# Patient Record
Sex: FEMALE | Race: WHITE | NOT HISPANIC OR LATINO | Employment: FULL TIME
[De-identification: names, ages, dates, MRNs, and addresses within clinical notes are randomized per-mention and may not be internally consistent; named-entity substitution may affect disease eponyms.]

---

## 2017-07-15 ENCOUNTER — HEALTH MAINTENANCE LETTER (OUTPATIENT)
Age: 32
End: 2017-07-15

## 2021-08-03 ENCOUNTER — PRENATAL OFFICE VISIT (OUTPATIENT)
Dept: OBGYN | Facility: CLINIC | Age: 36
End: 2021-08-03

## 2021-08-03 ENCOUNTER — APPOINTMENT (OUTPATIENT)
Dept: OBGYN | Facility: CLINIC | Age: 36
End: 2021-08-03
Payer: COMMERCIAL

## 2021-08-03 DIAGNOSIS — Z34.00 PRENATAL CARE, FIRST PREGNANCY: ICD-10-CM

## 2021-08-03 PROCEDURE — 99207 PR NO CHARGE NURSE ONLY: CPT | Performed by: OBSTETRICS & GYNECOLOGY

## 2021-08-03 RX ORDER — PRENATAL VIT/IRON FUM/FOLIC AC 27MG-0.8MG
1 TABLET ORAL DAILY
COMMUNITY

## 2021-08-03 NOTE — PROGRESS NOTES
Lifestyle and nutrition teaching completed   [facility] OB Intake Nurse    Patient supplied answers from flow sheet for:  Prenatal OB Questionnaire.  Past Medical History  Have you ever recieved care for your mental health? 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)  Within the last year, has anyone hit, slapped, kicked or otherwise hurt you?: No  In the last year, has anyone forced you to have sex when you didn't want to?: No    Past Medical History 2   Have you ever received a blood transfusion?: No  Would you accept a blood transfusion if was medically recommended?: Yes  Does anyone in your home smoke?: No   Is your blood type Rh negative?: Unknown  Have you ever ?: No  Have you been hospitalized for a nonsurgical reason excluding normal delivery?: (!) Yes (TBI - was in a coma for about 4-5 days- rehab)  Have you ever had an abnormal pap smear?: No    Past Medical History (Continued)  Do you have a history of abnormalities of the uterus?: No  Did your mother take MANDY or any other hormones when she was pregnant with you?: Unknown  Do you have any other problems we have not asked about which you feel may be important to this pregnancy?: No

## 2021-08-06 ENCOUNTER — TRANSCRIBE ORDERS (OUTPATIENT)
Dept: MATERNAL FETAL MEDICINE | Facility: HOSPITAL | Age: 36
End: 2021-08-06

## 2021-08-06 ENCOUNTER — PRENATAL OFFICE VISIT (OUTPATIENT)
Dept: OBGYN | Facility: CLINIC | Age: 36
End: 2021-08-06
Payer: COMMERCIAL

## 2021-08-06 VITALS
HEIGHT: 62 IN | BODY MASS INDEX: 41.49 KG/M2 | DIASTOLIC BLOOD PRESSURE: 65 MMHG | HEART RATE: 80 BPM | SYSTOLIC BLOOD PRESSURE: 108 MMHG | WEIGHT: 225.5 LBS | TEMPERATURE: 98.7 F | RESPIRATION RATE: 12 BRPM

## 2021-08-06 DIAGNOSIS — Z34.01 ENCOUNTER FOR PRENATAL CARE IN FIRST TRIMESTER OF FIRST PREGNANCY: Primary | ICD-10-CM

## 2021-08-06 DIAGNOSIS — O26.90 PREGNANCY RELATED CONDITION, ANTEPARTUM: Primary | ICD-10-CM

## 2021-08-06 DIAGNOSIS — O21.9 NAUSEA AND VOMITING IN PREGNANCY: ICD-10-CM

## 2021-08-06 LAB
ABO/RH(D): NORMAL
ALBUMIN UR-MCNC: NEGATIVE MG/DL
ANTIBODY SCREEN: NEGATIVE
APPEARANCE UR: CLEAR
BACTERIA #/AREA URNS HPF: ABNORMAL /HPF
BILIRUB UR QL STRIP: NEGATIVE
COLOR UR AUTO: YELLOW
ERYTHROCYTE [DISTWIDTH] IN BLOOD BY AUTOMATED COUNT: 13 % (ref 10–15)
GLUCOSE UR STRIP-MCNC: NEGATIVE MG/DL
HBV SURFACE AG SERPL QL IA: NONREACTIVE
HCT VFR BLD AUTO: 40.8 % (ref 35–47)
HCV AB SERPL QL IA: NONREACTIVE
HGB BLD-MCNC: 13.4 G/DL (ref 11.7–15.7)
HGB UR QL STRIP: ABNORMAL
HIV 1+2 AB+HIV1 P24 AG SERPL QL IA: NONREACTIVE
KETONES UR STRIP-MCNC: ABNORMAL MG/DL
LEUKOCYTE ESTERASE UR QL STRIP: NEGATIVE
MCH RBC QN AUTO: 29.5 PG (ref 26.5–33)
MCHC RBC AUTO-ENTMCNC: 32.8 G/DL (ref 31.5–36.5)
MCV RBC AUTO: 90 FL (ref 78–100)
NITRATE UR QL: NEGATIVE
PH UR STRIP: 6 [PH] (ref 5–7)
PLATELET # BLD AUTO: 270 10E3/UL (ref 150–450)
RBC # BLD AUTO: 4.55 10E6/UL (ref 3.8–5.2)
RBC #/AREA URNS AUTO: ABNORMAL /HPF
SP GR UR STRIP: 1.02 (ref 1–1.03)
SPECIMEN EXPIRATION DATE: NORMAL
SQUAMOUS #/AREA URNS AUTO: ABNORMAL /LPF
T PALLIDUM AB SER QL: NONREACTIVE
UROBILINOGEN UR STRIP-ACNC: 0.2 E.U./DL
WBC # BLD AUTO: 9.9 10E3/UL (ref 4–11)
WBC #/AREA URNS AUTO: ABNORMAL /HPF

## 2021-08-06 PROCEDURE — 81001 URINALYSIS AUTO W/SCOPE: CPT | Performed by: OBSTETRICS & GYNECOLOGY

## 2021-08-06 PROCEDURE — 86900 BLOOD TYPING SEROLOGIC ABO: CPT | Performed by: OBSTETRICS & GYNECOLOGY

## 2021-08-06 PROCEDURE — 86803 HEPATITIS C AB TEST: CPT | Performed by: OBSTETRICS & GYNECOLOGY

## 2021-08-06 PROCEDURE — 76817 TRANSVAGINAL US OBSTETRIC: CPT | Performed by: OBSTETRICS & GYNECOLOGY

## 2021-08-06 PROCEDURE — 86780 TREPONEMA PALLIDUM: CPT | Performed by: OBSTETRICS & GYNECOLOGY

## 2021-08-06 PROCEDURE — 87389 HIV-1 AG W/HIV-1&-2 AB AG IA: CPT | Performed by: OBSTETRICS & GYNECOLOGY

## 2021-08-06 PROCEDURE — 85027 COMPLETE CBC AUTOMATED: CPT | Performed by: OBSTETRICS & GYNECOLOGY

## 2021-08-06 PROCEDURE — 86762 RUBELLA ANTIBODY: CPT | Performed by: OBSTETRICS & GYNECOLOGY

## 2021-08-06 PROCEDURE — 87491 CHLMYD TRACH DNA AMP PROBE: CPT | Performed by: OBSTETRICS & GYNECOLOGY

## 2021-08-06 PROCEDURE — 86901 BLOOD TYPING SEROLOGIC RH(D): CPT | Performed by: OBSTETRICS & GYNECOLOGY

## 2021-08-06 PROCEDURE — 36415 COLL VENOUS BLD VENIPUNCTURE: CPT | Performed by: OBSTETRICS & GYNECOLOGY

## 2021-08-06 PROCEDURE — 99207 PR FIRST OB VISIT: CPT | Performed by: OBSTETRICS & GYNECOLOGY

## 2021-08-06 PROCEDURE — 87086 URINE CULTURE/COLONY COUNT: CPT | Performed by: OBSTETRICS & GYNECOLOGY

## 2021-08-06 PROCEDURE — 87591 N.GONORRHOEAE DNA AMP PROB: CPT | Performed by: OBSTETRICS & GYNECOLOGY

## 2021-08-06 PROCEDURE — 86850 RBC ANTIBODY SCREEN: CPT | Performed by: OBSTETRICS & GYNECOLOGY

## 2021-08-06 PROCEDURE — 87340 HEPATITIS B SURFACE AG IA: CPT | Performed by: OBSTETRICS & GYNECOLOGY

## 2021-08-06 RX ORDER — ONDANSETRON 4 MG/1
4 TABLET, ORALLY DISINTEGRATING ORAL EVERY 8 HOURS PRN
Qty: 20 TABLET | Refills: 1 | Status: SHIPPED | OUTPATIENT
Start: 2021-08-06 | End: 2021-11-05

## 2021-08-06 ASSESSMENT — MIFFLIN-ST. JEOR: SCORE: 1681.11

## 2021-08-06 NOTE — PROGRESS NOTES
Discussed physician coverage, tertiary support, diet, exercise, weight gain, schedule of visits, routine and indicated ultrasounds, childbirth education and antepartum testing for certain birth defects.  Encouraged patient to review contents of Prenatal Breastfeeding Education Toolkit. Offered opportunity to answer questions regarding the importance of skin to skin contact, early initiation of exclusive breastfeeding for the first six months and rooming in while in the hospital.    Syphilis is a sexually transmitted disease that can cause birth defects in the babies of untreated mothers. Every pregnant patient is tested for syphilis early in each pregnancy as part of the routine lab work. The [place] Department of [facility] has seen an increase in the rate of syphilis in [place]. The [facility] now recommends testing for syphilis 2 times during a pregnancy, the new prenatal visit, and 28 weeks or when admitted for delivery. Patient accepts lab testing for syphilis.    Group call support for deliveries was discussed, as well as tertiary support in event of high risk issues within [facility].    Discussed current state of COVID-19 in pregnancy, when to seek out emergency care, how to self care at home with milder symptoms.  Discussed COVID vaccine, latest safety information, potential benefits in pregnancy to mom and baby (lower risk for hospitalization and death)        Options for  testing for birth defects were discussed with the patient, generally including CVS testing, Quad screen serum testing, nuchal lucency/blood marker testing, genetic amniocentesis, NIPT testing  and/or level 2 ultrasound. Pt declined testing at this time    Current issues include: nausea, moderate  Conceived after first cycle of Clomid    Past medical, surgical, social and family histories reviewed on OB questionaire and included on episode summary.  Pertinent review of systems items stated above. See OB  "questionaire for pertinent components of HPI.    Past Medical History:   Diagnosis Date     Seizure (H)     x1 after her brain surgery     See epic chart    Past Surgical History:   Procedure Laterality Date     BRAIN SURGERY  2015    TBI after falling down stairs     See epic chart    Patient Active Problem List    Diagnosis Date Noted     Thickening of nuchal fold of fetus 08/06/2021     Priority: Medium     Prenatal care, first pregnancy 08/03/2021     Priority: Medium     Expressive aphasia 10/23/2015     Priority: Medium     CARDIOVASCULAR SCREENING; LDL GOAL LESS THAN 160 09/11/2015     Priority: Medium     Traumatic brain injury with prolonged (more than 24 hours) loss of consciousness with return to pre-existing conscious level (H) 07/05/2015     Priority: Medium     Intracranial hemorrhage (H) 07/05/2015     Priority: Medium     Impulsiveness, due to TBI 07/05/2015     Priority: Medium       OBJECTIVE: /65 (BP Location: Right arm, Patient Position: Sitting, Cuff Size: Adult Regular)   Pulse 80   Temp 98.7  F (37.1  C) (Tympanic)   Resp 12   Ht 1.575 m (5' 2\")   Wt 102.3 kg (225 lb 8 oz)   LMP 05/28/2021   BMI 41.24 kg/m      GENERAL APPEARANCE: healthy, alert and no distress  ABDOMEN:  soft, nontender, no hepato-splenomegaly or hernias  PELVIC:  EGBUS:  within normal limits  VAGINA:  normoestrogenic, well-supported, no unusual discharge  CERVIX:  smooth, non-friable, no gross lesions, thin-layer PAP was not taken  UTERUS:  anteverted and enlarged, 9 weeks size  ADNEXAE:  non-tender, no masses palpable, no cul de sac nodularity     NECK: no adenopathy, no asymmetry, masses, or scars and thyroid normal to palpation     RESP: lungs clear to auscultation , respiratory effort WNL     CV: regular rates and rhythm, normal S1 S2, no S3 or S4 and no murmur, click or rub -     SKIN: no suspicious lesions or rashes     PSYCH: mentation appears normal. and affect normal/bright, oriented to " person/place/time     LYMPHATICS: No axillary, cervical, inguinal, or supraclavicular nodes    Transvaginal ultrasound was performed. A live single intrauterine pregnancy was seen.  CRL=2.95 cm, c/w 9 weeks, 6 days.  EDC by sono =3/5/22  EDC by LMP=3/4/22+++    Of note, there was a curvilinear fluid collection of posterior nuchal area of fetal pole    Fetal heart motion was visualized. VWY=851 bpm                    ASSESSMENT:    ICD-10-CM    1. Prenatal care, first pregnancy  Z34.00 US OB <14 Weeks w Transvaginal Single     ABO/Rh type and screen     CBC with platelets     Hepatitis B surface antigen     Rubella Antibody IgG     HIV Antigen Antibody Combo     Treponema Abs w Reflex to RPR and Titer     Hepatitis C Screen Reflex to HCV RNA Quant and Genotype     UA with Microscopic     Urine Culture     Chlamydia trachomatis/Neisseria gonorrhoeae by PCR   2. Nausea and vomiting in pregnancy  O21.9 ondansetron (ZOFRAN-ODT) 4 MG ODT tab   3. Thickening of nuchal fold of fetus  O35.1XX0 [facility] Referral - Pregnancy         PLAN: see orders and OB problem list annotations  Recommend referral to MFM for second opinion about nuchal fluid collection and any further testing advised  F/u 4 weeks    Lupe Goldstein MD

## 2021-08-06 NOTE — NURSING NOTE
"Initial /65 (BP Location: Right arm, Patient Position: Sitting, Cuff Size: Adult Regular)   Pulse 80   Temp 98.7  F (37.1  C) (Tympanic)   Resp 12   Ht 1.575 m (5' 2\")   Wt 102.3 kg (225 lb 8 oz)   LMP 05/28/2021   BMI 41.24 kg/m   Estimated body mass index is 41.24 kg/m  as calculated from the following:    Height as of this encounter: 1.575 m (5' 2\").    Weight as of this encounter: 102.3 kg (225 lb 8 oz). .      "

## 2021-08-07 LAB
BACTERIA UR CULT: NO GROWTH
C TRACH DNA SPEC QL PROBE+SIG AMP: NEGATIVE
N GONORRHOEA DNA SPEC QL NAA+PROBE: NEGATIVE

## 2021-08-09 LAB
RUBV IGG SERPL QL IA: 3.41 INDEX
RUBV IGG SERPL QL IA: POSITIVE

## 2021-08-23 ENCOUNTER — PRE VISIT (OUTPATIENT)
Dept: MATERNAL FETAL MEDICINE | Facility: HOSPITAL | Age: 36
End: 2021-08-23

## 2021-08-26 NOTE — PROGRESS NOTES
[facility]  Genetic Counseling Consult    Patient: Vianey Cortez YOB: 1985   Date of Service:  21      Vianey Cortez was seen at the [facility] for genetic consultation given abnormal ultrasound findings. Vianey was accompanied to the appointment today by her partner, Sung.    Impression/Plan:   1. Vianey was referred due to subjectively increased nuchal fluid on ultrasound at 9w6d. NT ultrasound was performed today and nuchal translucency measurement and nasal bone assessment were within normal limits. Please see ultrasound report under the Imaging tab for details.     2. Vianey had a blood draw for NIPT (NIPS test through [facility]). The patient does NOT wish to know the predicted genetic sex of the pregnancy and sex chromosome aneuploidy analysis was declined. Results are expected within 7-10 days, and will be available in [employer]. We will contact her to discuss the results, and a copy will be forwarded to the office of the referring OB provider. The patient provided verbal permission to leave a detailed message with results on her voicemail.     Pregnancy History:   /Parity:                            Age at Delivery: 36 year old  SEHY: 3/4/2022, by Last Menstrual Period                  Gestational Age: 13w0d  -  No significant complications or exposures were reported in the current pregnancy.    Medical History:   Vianey mclaughlin reported medical history is not expected to impact pregnancy management or risks to fetal development.       Family History:   A three-generation pedigree was obtained, and is scanned under the  Media  tab.   The following significant findings were reported by Vianey:    The father of the pregnancy, Sung, is 38 and healthy.      Cancer: Vianey reports that her father had colon cancer diagnosed in his 40's. Sung reports that a maternal aunt had breast cancer diagnosed in her  "30's-40's. Sung also reports a maternal cousin with leukemia diagnosed in her 30's.     We very briefly discussed the family history of cancer. We discussed that most cancer seen in families occurs sporadically, but about 5-10% may be due to an underlying genetic etiology. We discussed that both breast cancer and colon cancer diagnosed age 50 are rare and can be associated with inherited cancer predisposition syndromes. Genetic counseling is available for cancer syndromes. Cancer family history, even without genetic testing, can change cancer screening recommendations for family members and aid in insurance coverage for access to them as well. The most informative individuals to complete cancer genetic counseling and genetic testing are those with a personal history of cancer or those closely related to the affected individuals. If the family wants more information they can contact the [facility] Cancer Risk Management Program ([phone]). Physicians can also make referrals at [email or web address] or, if within the [employer] system, through [facility] referral for \"Cancer Risk Mgmt/Cancer Genetic Counseling\"     Otherwise, the reported family history is negative for multiple miscarriages, stillbirths, birth defects, intellectual disability, known genetic conditions, and consanguinity.       Carrier Screening:   The patient reports that she and the father of the pregnancy have  ancestry:     Cystic fibrosis is an autosomal recessive genetic condition that occurs with increased frequency in individuals of  ancestry and carrier screening for this condition is available.  In addition,  screening in the [facility] includes cystic fibrosis.    The patient reports that she is of Mediterranean ([place]) ancestry:     The hemoglobinopathies are a group of genetic blood diseases that occur with increased frequency in individuals of Mediterranean " ancestry and carrier screening for these conditions is available.  Carrier screening for the hemoglobinopathies includes a CBC with red blood cell indices, a ferritin level, and a quantitative hemoglobin electrophoresis or HPLC.  In addition,  screening in the [facility] includes many of the hemoglobinopathies.      Expanded carrier screening for mutations in a large panel of genes associated with autosomal recessive conditions including cystic fibrosis, spinal muscular atrophy, and others, is now available.      Carrier screening was beyond the scope of our discussion today, but remains available to the couple.        Risk Assessment:   We explained that the risk for fetal chromosome abnormalities increases with maternal age. We discussed specific features of common chromosome abnormalities, including Down syndrome, trisomy 13, trisomy 18, and sex chromosome trisomies.      - At age 36 at midtrimester, the risk to have a baby with Down syndrome is 1 in 342.     - At age 36 at midtrimester, the risk to have a baby with any chromosome abnormality is 1 in  172.     The patient had a first trimester transvaginal ultrasound at 9w6d which showed subjectively increased nuchal fluid. CRL was 29.5mm. Additional aneuploidy screening options were declined at that time.     We started by discussing that generally nuchal translucency measurements are not able to be taken accurately until a fetus is between 11w4d - 13w6d gestation (or CRL of 45 and 84mm). We discussed that an accurate NT measurement will need to be taken in order for an accurate risk assessment to be provided. Vianey had an NT ultrasound scheduled following our visit today to obtain an accurate NT measurement. We discussed the following information is true IF there is a thick NT identified on her NT ultrasound.      We discussed that an increased nuchal translucency can be a marker for chromosomal abnormalities, heart defects, skeletal dysplasias,  and other more rare genetic conditions. The specific risks associated with an increased NT are dependent on the size of the NT measurement. Due to these associations, additional screening/testing is available for these pregnancies which may include:    Non-invasive prenatal screening (NIPT) and/or diagnostic testing via chorionic villus sampling (CVS) or amniocentesis    Comprehensive ultrasound around 18-20 weeks gestation    Fetal echocardiogram around or after 22 weeks gestation      There has been some data in the literature to suggest that when a pregnancy with an increased nuchal translucency has been shown to have normal chromosomes (through a CVS or amniocentesis) and had a normal ultrasound and normal fetal echocardiogram, the risk of a major medical or developmental issue in that pregnancy is likely back to around the general population risk for these issues.     We also discussed that a thickened NT can result in a cystic hygroma, which is a congenital malformation resulting from lymph accumulation and are often large in size and extend further down the length of the fetus than an increased nuchal translucency. Cystic hygromas are generally more concerning than an increased nuchal translucency.     We broadly discussed the possibility of more rare genetic conditions being the cause of the extra fluid. These more rare possibilities include chromosomal microdeletions/microduplications, as well as monogenic conditions.     We discussed the options of screening by NIPT or diagnostic options such as a CVS or amniocentesis. After our discussion of the utility, benefits, risks, and limitations, Vianey elected to proceed with NIPT (NIPS through [facility]) during today's consult.        Testing Options:   We discussed the following options:   Non-invasive Prenatal Testing (NIPT)    Maternal plasma cell-free DNA testing; first trimester ultrasound with nuchal translucency and nasal bone assessment is recommended,  when appropriate    Screens for fetal trisomy 21, trisomy 13, trisomy 18, and sex chromosome aneuploidy    Cannot screen for open neural tube defects; maternal serum AFP after 15 weeks is recommended     Chorionic villus sampling (CVS)    Invasive procedure typically performed in the first trimester by which placental villi are obtained for the purpose of chromosome analysis and/or other prenatal genetic analysis    Diagnostic results; >99% sensitivity for fetal chromosome abnormalities    Cannot test for open neural tube defects; maternal serum AFP after 15 weeks is recommended     Genetic Amniocentesis    Invasive procedure typically performed in the second trimester by which amniotic fluid is obtained for the purpose of chromosome analysis and/or other prenatal genetic analysis    Diagnostic results; >99% sensitivity for fetal chromosome abnormalities    AFAFP measurement tests for open neural tube defects     Comprehensive (Level II) ultrasound: Detailed ultrasound performed between 18-22 weeks gestation to screen for major birth defects and markers for aneuploidy.    We reviewed the benefits and limitations of this testing.  Screening tests provide a risk assessment specific to the pregnancy for certain fetal chromosome abnormalities, but cannot definitively diagnose or exclude a fetal chromosome abnormality.  Follow-up genetic counseling and consideration of diagnostic testing is recommended with any abnormal screening result.     Diagnostic tests carry inherent risks- including risk of miscarriage- that require careful consideration.  These tests can detect fetal chromosome abnormalities with greater than 99% certainty.  Results can be compromised by maternal cell contamination or mosaicism, and are limited by the resolution of cytogenetic G-banding technology.  There is no screening nor diagnostic test that can detect all forms of birth defects or mental disability.    It was a pleasure to be involved with  Vianey hatfield. Face-to-face time of the meeting was 35 minutes.  Candy Duran CGC  Genetic Counselor  [facility]  Maternal Fetal Medicine  Phone: [phone]  [email or web address]     Patient seen, evaluated and discussed with the Genetic Counseling Intern. I have verified the content of the note, which accurately reflects my assessment of the patient and the plan of care.   Supervising Genetic Counselor   Gadiel Crawford MS, [facility]   Maternal Fetal Medicine  [facility]   Phone: [phone]   Email: [email or web address]

## 2021-08-27 ENCOUNTER — ANCILLARY PROCEDURE (OUTPATIENT)
Dept: ULTRASOUND IMAGING | Facility: HOSPITAL | Age: 36
End: 2021-08-27
Attending: OBSTETRICS & GYNECOLOGY
Payer: COMMERCIAL

## 2021-08-27 ENCOUNTER — OFFICE VISIT (OUTPATIENT)
Dept: MATERNAL FETAL MEDICINE | Facility: HOSPITAL | Age: 36
End: 2021-08-27
Attending: OBSTETRICS & GYNECOLOGY
Payer: COMMERCIAL

## 2021-08-27 ENCOUNTER — LAB (OUTPATIENT)
Dept: LAB | Facility: HOSPITAL | Age: 36
End: 2021-08-27
Attending: OBSTETRICS & GYNECOLOGY
Payer: COMMERCIAL

## 2021-08-27 DIAGNOSIS — O26.90 PREGNANCY RELATED CONDITION, ANTEPARTUM: ICD-10-CM

## 2021-08-27 DIAGNOSIS — Z36.9 UNSPECIFIED ANTENATAL SCREENING: ICD-10-CM

## 2021-08-27 DIAGNOSIS — O35.9XX0 FETAL ABNORMALITY AFFECTING MANAGEMENT OF MOTHER, SINGLE OR UNSPECIFIED FETUS: Primary | ICD-10-CM

## 2021-08-27 DIAGNOSIS — O28.3 ABNORMAL PRENATAL ULTRASOUND: Primary | ICD-10-CM

## 2021-08-27 PROCEDURE — 99207 PR NO CHARGE LOS: CPT | Performed by: OBSTETRICS & GYNECOLOGY

## 2021-08-27 PROCEDURE — 36415 COLL VENOUS BLD VENIPUNCTURE: CPT

## 2021-08-27 PROCEDURE — 76813 OB US NUCHAL MEAS 1 GEST: CPT | Mod: 26 | Performed by: OBSTETRICS & GYNECOLOGY

## 2021-08-27 PROCEDURE — 999N000069 HC STATISTIC GENETIC COUNSELING, < 16 MIN

## 2021-08-27 PROCEDURE — 76813 OB US NUCHAL MEAS 1 GEST: CPT

## 2021-08-27 PROCEDURE — 96040 HC GENETIC COUNSELING, EACH 30 MINUTES: CPT

## 2021-08-27 NOTE — PROGRESS NOTES
Please see full imaging report from [employer] program under imaging tab.      Almas Jain MD  Maternal Fetal Medicine

## 2021-09-03 ENCOUNTER — TELEPHONE (OUTPATIENT)
Dept: MATERNAL FETAL MEDICINE | Facility: CLINIC | Age: 36
End: 2021-09-03

## 2021-09-03 ENCOUNTER — PRENATAL OFFICE VISIT (OUTPATIENT)
Dept: OBGYN | Facility: CLINIC | Age: 36
End: 2021-09-03
Payer: COMMERCIAL

## 2021-09-03 VITALS
DIASTOLIC BLOOD PRESSURE: 73 MMHG | WEIGHT: 229.3 LBS | BODY MASS INDEX: 42.2 KG/M2 | SYSTOLIC BLOOD PRESSURE: 106 MMHG | TEMPERATURE: 98.1 F | HEIGHT: 62 IN | HEART RATE: 81 BPM | RESPIRATION RATE: 16 BRPM

## 2021-09-03 DIAGNOSIS — Z34.92 PRENATAL CARE, SECOND TRIMESTER: Primary | ICD-10-CM

## 2021-09-03 LAB — SCANNED LAB RESULT: NORMAL

## 2021-09-03 PROCEDURE — 99207 PR PRENATAL VISIT: CPT | Performed by: OBSTETRICS & GYNECOLOGY

## 2021-09-03 PROCEDURE — 76801 OB US < 14 WKS SINGLE FETUS: CPT | Performed by: OBSTETRICS & GYNECOLOGY

## 2021-09-03 ASSESSMENT — MIFFLIN-ST. JEOR: SCORE: 1698.35

## 2021-09-03 NOTE — TELEPHONE ENCOUNTER
September 3, 2021    Left a message for Vianey regarding her NIPT results.     Results indicate NO ANEUPLOIDY DETECTED for chromosomes 21, 18, or 13.     This puts her current pregnancy at low risk for Down syndrome, trisomy 18, trisomy 13 and sex chromosome abnormalities. This test is reported to have the following sensitivities: Down syndrome: 99.99%, trisomy 18: 99.99%, and trisomy 13: 99.99%. Although these results are reassuring, this does not replace a standard chromosome analysis from a chorionic villus sampling or amniocentesis.     MSAFP is the appropriate second trimester screening test for open neural tube defects; the maternal quad screen is not recommended.    Her results are available in her Epic chart for her primary OB to review.     Candy Duran [facility]  Genetic Counselor  [facility]  Maternal Fetal Medicine  Phone: [phone]  [email or web address]

## 2021-09-03 NOTE — PROGRESS NOTES
"[facility]   OB/GYN Clinic    CC: Return OB     Subjective:    Vianey is a 35 year old  at 14w0d by Patient's last menstrual period was 2021. c/w 9w6d US who presents for return OB visit. She reports feeling well. Denies uterine cramping, vaginal bleeding or leaking, dysuria. No FM.   Eating and drinking well.     Objective:  /73 (BP Location: Right arm, Cuff Size: Adult Large)   Pulse 81   Temp 98.1  F (36.7  C)   Resp 16   Ht 1.575 m (5' 2\")   Wt 104 kg (229 lb 4.8 oz)   LMP 2021   BMI 41.94 kg/m      Estimated body mass index is 41.94 kg/m  as calculated from the following:    Height as of this encounter: 1.575 m (5' 2\").    Weight as of this encounter: 104 kg (229 lb 4.8 oz).    Physical Exam:  Gen: Pleasant, talkative female in no apparent distress   Respiratory: breathing comfortably on room air   Cardiac: Warm and well-perfused.   GI: Abd soft and non-tender, gravid  MSK: Grossly normal movement of all four extremities  Psych: mood and affect bright     Transabdominal US was performed as I was unable to illicit FHT with the handheld doppler:  Espinoza IUP at 13w5d, +cardiac activity at 150 bpm, grossly normal fluid and fetal movements          Assessment/Plan:   35 year old  at 14w0d by LMP of Patient's last menstrual period was 2021. c/w 9w5d US who presents for follow-up OB visit.   New OB labs nl  Nuchal fluid collection noted on 10 wk; normal follow-up MFM US, NIPT nl  Offered L2, but declined as she had a poor experience at [facility]  Anatomy scan ordered   BMI 41; needs hgba1c at next appt vs early GCT, needs BPPs at 36wks     Return to clinic in 4 weeks as scheduled      Simona Lozano MD  OB/GYN  9/3/2021        "

## 2021-09-18 ENCOUNTER — HEALTH MAINTENANCE LETTER (OUTPATIENT)
Age: 36
End: 2021-09-18

## 2021-10-01 ENCOUNTER — PRENATAL OFFICE VISIT (OUTPATIENT)
Dept: OBGYN | Facility: CLINIC | Age: 36
End: 2021-10-01
Payer: COMMERCIAL

## 2021-10-01 VITALS
TEMPERATURE: 97.9 F | HEIGHT: 62 IN | HEART RATE: 89 BPM | BODY MASS INDEX: 42.33 KG/M2 | WEIGHT: 230 LBS | RESPIRATION RATE: 18 BRPM | SYSTOLIC BLOOD PRESSURE: 107 MMHG | DIASTOLIC BLOOD PRESSURE: 67 MMHG

## 2021-10-01 DIAGNOSIS — Z34.02 ENCOUNTER FOR PRENATAL CARE IN SECOND TRIMESTER OF FIRST PREGNANCY: Primary | ICD-10-CM

## 2021-10-01 PROBLEM — Z80.0 FAMILY HX OF COLON CANCER: Status: ACTIVE | Noted: 2021-01-26

## 2021-10-01 PROBLEM — Z31.9 INFERTILITY MANAGEMENT: Status: RESOLVED | Noted: 2021-05-07 | Resolved: 2021-10-01

## 2021-10-01 PROBLEM — Z31.9 INFERTILITY MANAGEMENT: Status: ACTIVE | Noted: 2021-05-07

## 2021-10-01 PROBLEM — E66.01 MORBID OBESITY WITH BMI OF 40.0-44.9, ADULT (H): Status: ACTIVE | Noted: 2021-01-26

## 2021-10-01 PROCEDURE — 99207 PR PRENATAL VISIT: CPT | Performed by: OBSTETRICS & GYNECOLOGY

## 2021-10-01 ASSESSMENT — MIFFLIN-ST. JEOR: SCORE: 1701.52

## 2021-10-01 NOTE — PROGRESS NOTES
"CC: Here for routine prenatal visit @ 18w0d   HPI: no movement yet.  No cramping or bleeding.  No complaints.     PE: /67 (BP Location: Left arm, Cuff Size: Adult Large)   Pulse 89   Temp 97.9  F (36.6  C)   Resp 18   Ht 1.575 m (5' 2\")   Wt 104.3 kg (230 lb)   LMP 05/28/2021   BMI 42.07 kg/m     See OB flowsheet    Anomaly screen on 10/15    A/P G1 @ 18w0d normal pregnancy    1. Routine prenatal care.  COVID restrictions and recommendations reviewed including iron supplementation.  S/p COVID vaccination.  Flu shot planned at work    RTC 4 weeks.      Iliana Shrestha M.D.    "

## 2021-10-15 ENCOUNTER — HOSPITAL ENCOUNTER (OUTPATIENT)
Dept: ULTRASOUND IMAGING | Facility: CLINIC | Age: 36
Discharge: HOME | End: 2021-10-15
Attending: OBSTETRICS & GYNECOLOGY | Admitting: OBSTETRICS & GYNECOLOGY
Payer: COMMERCIAL

## 2021-10-15 DIAGNOSIS — Z34.92 PRENATAL CARE, SECOND TRIMESTER: ICD-10-CM

## 2021-10-15 DIAGNOSIS — O44.42 LOW LYING PLACENTA NOS OR WITHOUT HEMORRHAGE, SECOND TRIMESTER: Primary | ICD-10-CM

## 2021-10-15 PROCEDURE — 76805 OB US >/= 14 WKS SNGL FETUS: CPT

## 2021-11-05 ENCOUNTER — PRENATAL OFFICE VISIT (OUTPATIENT)
Dept: OBGYN | Facility: CLINIC | Age: 36
End: 2021-11-05
Payer: COMMERCIAL

## 2021-11-05 VITALS
SYSTOLIC BLOOD PRESSURE: 119 MMHG | TEMPERATURE: 98 F | DIASTOLIC BLOOD PRESSURE: 70 MMHG | BODY MASS INDEX: 42.99 KG/M2 | WEIGHT: 233.6 LBS | HEART RATE: 86 BPM | HEIGHT: 62 IN | RESPIRATION RATE: 16 BRPM

## 2021-11-05 DIAGNOSIS — Z34.02 ENCOUNTER FOR PRENATAL CARE IN SECOND TRIMESTER OF FIRST PREGNANCY: Primary | ICD-10-CM

## 2021-11-05 PROCEDURE — 99207 PR PRENATAL VISIT: CPT | Performed by: OBSTETRICS & GYNECOLOGY

## 2021-11-05 ASSESSMENT — MIFFLIN-ST. JEOR: SCORE: 1712.85

## 2021-11-05 NOTE — PROGRESS NOTES
"CC: Here for routine prenatal visit @ 23w0d   HPI: + FM, no ctx, no LOF, no VB.  No complaints.     PE: /70 (BP Location: Right arm, Patient Position: Chair, Cuff Size: Adult Regular)   Pulse 86   Temp 98  F (36.7  C) (Tympanic)   Resp 16   Ht 1.575 m (5' 2\")   Wt 106 kg (233 lb 9.6 oz)   LMP 05/28/2021   Breastfeeding No   BMI 42.73 kg/m     See OB flowsheet    GCT planned for next week.      A/P G1 @ 23w0d normal pregnancy    1. Routine prenatal care.  COVID restrictions and recommendations reviewed including iron supplementation.   2. Growth scan planned for next week    RTC 4 weeks.      Iliana Shrestha M.D.    "

## 2021-11-05 NOTE — NURSING NOTE
"Initial /70 (BP Location: Right arm, Patient Position: Chair, Cuff Size: Adult Regular)   Pulse 86   Temp 98  F (36.7  C) (Tympanic)   Resp 16   Ht 1.575 m (5' 2\")   Wt 106 kg (233 lb 9.6 oz)   LMP 05/28/2021   Breastfeeding No   BMI 42.73 kg/m   Estimated body mass index is 42.73 kg/m  as calculated from the following:    Height as of this encounter: 1.575 m (5' 2\").    Weight as of this encounter: 106 kg (233 lb 9.6 oz). .    Vianey Gutierrez, SHILA    "

## 2021-11-12 ENCOUNTER — LAB (OUTPATIENT)
Dept: LAB | Facility: CLINIC | Age: 36
End: 2021-11-12
Payer: COMMERCIAL

## 2021-11-12 ENCOUNTER — HOSPITAL ENCOUNTER (OUTPATIENT)
Dept: ULTRASOUND IMAGING | Facility: CLINIC | Age: 36
Discharge: HOME | End: 2021-11-12
Attending: OBSTETRICS & GYNECOLOGY | Admitting: OBSTETRICS & GYNECOLOGY
Payer: COMMERCIAL

## 2021-11-12 DIAGNOSIS — Z34.02 ENCOUNTER FOR PRENATAL CARE IN SECOND TRIMESTER OF FIRST PREGNANCY: ICD-10-CM

## 2021-11-12 DIAGNOSIS — O44.42 LOW LYING PLACENTA NOS OR WITHOUT HEMORRHAGE, SECOND TRIMESTER: ICD-10-CM

## 2021-11-12 LAB
ERYTHROCYTE [DISTWIDTH] IN BLOOD BY AUTOMATED COUNT: 14.3 % (ref 10–15)
GLUCOSE 1H P 50 G GLC PO SERPL-MCNC: 105 MG/DL (ref 70–129)
HCT VFR BLD AUTO: 37.2 % (ref 35–47)
HGB BLD-MCNC: 12.2 G/DL (ref 11.7–15.7)
MCH RBC QN AUTO: 30 PG (ref 26.5–33)
MCHC RBC AUTO-ENTMCNC: 32.8 G/DL (ref 31.5–36.5)
MCV RBC AUTO: 91 FL (ref 78–100)
PLATELET # BLD AUTO: 256 10E3/UL (ref 150–450)
RBC # BLD AUTO: 4.07 10E6/UL (ref 3.8–5.2)
T PALLIDUM AB SER QL: NONREACTIVE
WBC # BLD AUTO: 11.2 10E3/UL (ref 4–11)

## 2021-11-12 PROCEDURE — 36415 COLL VENOUS BLD VENIPUNCTURE: CPT

## 2021-11-12 PROCEDURE — 86780 TREPONEMA PALLIDUM: CPT

## 2021-11-12 PROCEDURE — 85027 COMPLETE CBC AUTOMATED: CPT

## 2021-11-12 PROCEDURE — 82950 GLUCOSE TEST: CPT

## 2021-11-12 PROCEDURE — 76816 OB US FOLLOW-UP PER FETUS: CPT

## 2021-12-03 ENCOUNTER — PRENATAL OFFICE VISIT (OUTPATIENT)
Dept: OBGYN | Facility: CLINIC | Age: 36
End: 2021-12-03
Payer: COMMERCIAL

## 2021-12-03 VITALS
BODY MASS INDEX: 44.35 KG/M2 | TEMPERATURE: 97.3 F | HEIGHT: 62 IN | RESPIRATION RATE: 16 BRPM | WEIGHT: 241 LBS | HEART RATE: 88 BPM | DIASTOLIC BLOOD PRESSURE: 64 MMHG | SYSTOLIC BLOOD PRESSURE: 102 MMHG

## 2021-12-03 DIAGNOSIS — O99.210 OBESITY AFFECTING PREGNANCY, ANTEPARTUM: ICD-10-CM

## 2021-12-03 DIAGNOSIS — Z34.02 ENCOUNTER FOR PRENATAL CARE IN SECOND TRIMESTER OF FIRST PREGNANCY: Primary | ICD-10-CM

## 2021-12-03 PROCEDURE — 99207 PR PRENATAL VISIT: CPT | Performed by: OBSTETRICS & GYNECOLOGY

## 2021-12-03 ASSESSMENT — MIFFLIN-ST. JEOR: SCORE: 1746.42

## 2021-12-03 NOTE — NURSING NOTE
"Initial /64 (BP Location: Left arm, Patient Position: Chair, Cuff Size: Adult Large)   Pulse 88   Temp 97.3  F (36.3  C) (Tympanic)   Resp 16   Ht 1.575 m (5' 2\")   Wt 109.3 kg (241 lb)   LMP 05/28/2021   BMI 44.08 kg/m   Estimated body mass index is 44.08 kg/m  as calculated from the following:    Height as of this encounter: 1.575 m (5' 2\").    Weight as of this encounter: 109.3 kg (241 lb). .    Vianey Gutierrez, [facility]    "

## 2021-12-03 NOTE — PROGRESS NOTES
"CC: Here for routine prenatal visit @ 27w0d   HPI: + FM, no ctx, no LOF, no VB.  No complaints.     PE: /64 (BP Location: Left arm, Patient Position: Chair, Cuff Size: Adult Large)   Pulse 88   Temp 97.3  F (36.3  C) (Tympanic)   Resp 16   Ht 1.575 m (5' 2\")   Wt 109.3 kg (241 lb)   LMP 05/28/2021   BMI 44.08 kg/m     See OB flowsheet    11/12 U/S:  FETAL POSITION: Breech  PLACENTA LOCATION: Posterior  AMNIOTIC FLUID: Normal  FETAL HEART RATE: 146 bpm  No evidence for placenta previa. The LVOT is better seen and normal in  appearance.                                                                   IMPRESSION:  1.  Single intrauterine gestation.   2.  LVOT is better seen and is normal in appearance. This completes  the anatomic survey.  3.  No evidence for placenta previa    A/P G1 @ 27w0d normal pregnancy    1. Routine prenatal care.  COVID restrictions and recommendations reviewed including iron supplementation.  Normal labs.  Tdap next time  2. Thickened NF @ 10 weeks: normal MFM scan.  Serial growth scans  3. BMI>40: serial growth scans, BPPs @ 36 weeks  4. Low lying placenta: resolved    RTC 2-3 weeks.      Iliana Shrestha M.D.    "

## 2021-12-10 ENCOUNTER — HOSPITAL ENCOUNTER (OUTPATIENT)
Dept: ULTRASOUND IMAGING | Facility: CLINIC | Age: 36
Discharge: HOME | End: 2021-12-10
Attending: OBSTETRICS & GYNECOLOGY | Admitting: OBSTETRICS & GYNECOLOGY
Payer: COMMERCIAL

## 2021-12-10 DIAGNOSIS — O99.210 OBESITY AFFECTING PREGNANCY, ANTEPARTUM: ICD-10-CM

## 2021-12-10 DIAGNOSIS — Z34.02 ENCOUNTER FOR PRENATAL CARE IN SECOND TRIMESTER OF FIRST PREGNANCY: ICD-10-CM

## 2021-12-10 PROCEDURE — 76816 OB US FOLLOW-UP PER FETUS: CPT

## 2021-12-17 ENCOUNTER — PRENATAL OFFICE VISIT (OUTPATIENT)
Dept: OBGYN | Facility: CLINIC | Age: 36
End: 2021-12-17
Payer: COMMERCIAL

## 2021-12-17 VITALS
TEMPERATURE: 98.7 F | HEART RATE: 89 BPM | RESPIRATION RATE: 12 BRPM | SYSTOLIC BLOOD PRESSURE: 122 MMHG | WEIGHT: 246.7 LBS | HEIGHT: 62 IN | BODY MASS INDEX: 45.4 KG/M2 | DIASTOLIC BLOOD PRESSURE: 65 MMHG

## 2021-12-17 DIAGNOSIS — Z34.03 ENCOUNTER FOR PRENATAL CARE IN THIRD TRIMESTER OF FIRST PREGNANCY: Primary | ICD-10-CM

## 2021-12-17 PROCEDURE — 90471 IMMUNIZATION ADMIN: CPT | Performed by: OBSTETRICS & GYNECOLOGY

## 2021-12-17 PROCEDURE — 90715 TDAP VACCINE 7 YRS/> IM: CPT | Performed by: OBSTETRICS & GYNECOLOGY

## 2021-12-17 PROCEDURE — 99207 PR PRENATAL VISIT: CPT | Performed by: OBSTETRICS & GYNECOLOGY

## 2021-12-17 ASSESSMENT — MIFFLIN-ST. JEOR: SCORE: 1772.27

## 2021-12-17 NOTE — PROGRESS NOTES
"[facility]    Return OB Note    CC: Return OB     Subjective:  Vianey is a 36 year old  at 29w0d   Denies vaginal bleeding, loss of fluid, or regular contractions. Good fetal movement.  Complaints today: None    Objective:  /65 (BP Location: Left arm, Patient Position: Sitting, Cuff Size: Adult Large)   Pulse 89   Temp 98.7  F (37.1  C) (Tympanic)   Resp 12   Ht 1.575 m (5' 2\")   Wt 111.9 kg (246 lb 11.2 oz)   LMP 2021   BMI 45.12 kg/m      Fundal height: 31cm  FHT: 135bpm    Assessment/Plan:   Encounter Diagnoses   Name Primary?     Encounter for prenatal care in third trimester of first pregnancy Yes     BMI 40.0-44.9, adult (H)        IUP at 29w0d  -Low lying placenta: resolved  -Morbid obesity: serial growth US, 12/10 EFW 80%. Next US ordered today. Plan for BPPs at 36 weeks.   -Mid trimester labs normal  -S/p COVID and flu vaccines, TDap today   -Strict return precautions given    RTC 2 weeks    Neeta Vargas DO"

## 2021-12-17 NOTE — NURSING NOTE
"Initial /65 (BP Location: Left arm, Patient Position: Sitting, Cuff Size: Adult Large)   Pulse 89   Temp 98.7  F (37.1  C) (Tympanic)   Resp 12   Ht 1.575 m (5' 2\")   Wt 111.9 kg (246 lb 11.2 oz)   LMP 05/28/2021   BMI 45.12 kg/m   Estimated body mass index is 45.12 kg/m  as calculated from the following:    Height as of this encounter: 1.575 m (5' 2\").    Weight as of this encounter: 111.9 kg (246 lb 11.2 oz). .      "

## 2021-12-17 NOTE — PROGRESS NOTES
Prior to immunization administration, verified patients identity using patient s name and date of birth. Please see Immunization Activity for additional information.     Screening Questionnaire for Adult Immunization    Are you sick today?   No   Do you have allergies to medications, food, a vaccine component or latex?   No   Have you ever had a serious reaction after receiving a vaccination?   No   Do you have a long-term health problem with heart, lung, kidney, or metabolic disease (e.g., diabetes), asthma, a blood disorder, no spleen, complement component deficiency, a cochlear implant, or a spinal fluid leak?  Are you on long-term aspirin therapy?   No   Do you have cancer, leukemia, HIV/AIDS, or any other immune system problem?   No   Do you have a parent, brother, or sister with an immune system problem?   No   In the past 3 months, have you taken medications that affect  your immune system, such as prednisone, other steroids, or anticancer drugs; drugs for the treatment of rheumatoid arthritis, Crohn s disease, or psoriasis; or have you had radiation treatments?   No   Have you had a seizure, or a brain or other nervous system problem?   No   During the past year, have you received a transfusion of blood or blood    products, or been given immune (gamma) globulin or antiviral drug?   No   For women: Are you pregnant or is there a chance you could become       pregnant during the next month?   Yes   Have you received any vaccinations in the past 4 weeks?   No             Per orders of Dr. Vargas, injection of TDAP given by Satinder Bañuelos MA. Patient instructed to remain in clinic for 15 minutes afterwards, and to report any adverse reaction to me immediately.  Given in right deltoid       Screening performed by Satinder Bañuelos MA on 12/17/2021 at 3:28 PM.

## 2021-12-31 ENCOUNTER — PRENATAL OFFICE VISIT (OUTPATIENT)
Dept: OBGYN | Facility: CLINIC | Age: 36
End: 2021-12-31
Payer: COMMERCIAL

## 2021-12-31 VITALS
TEMPERATURE: 97.9 F | HEIGHT: 62 IN | SYSTOLIC BLOOD PRESSURE: 122 MMHG | WEIGHT: 244.6 LBS | HEART RATE: 91 BPM | DIASTOLIC BLOOD PRESSURE: 70 MMHG | BODY MASS INDEX: 45.01 KG/M2 | RESPIRATION RATE: 16 BRPM

## 2021-12-31 DIAGNOSIS — O99.213 OBESITY AFFECTING PREGNANCY IN THIRD TRIMESTER: ICD-10-CM

## 2021-12-31 DIAGNOSIS — Z34.03 ENCOUNTER FOR PRENATAL CARE IN THIRD TRIMESTER OF FIRST PREGNANCY: Primary | ICD-10-CM

## 2021-12-31 PROCEDURE — 99207 PR PRENATAL VISIT: CPT | Performed by: OBSTETRICS & GYNECOLOGY

## 2021-12-31 PROCEDURE — 59426 ANTEPARTUM CARE ONLY: CPT | Performed by: OBSTETRICS & GYNECOLOGY

## 2021-12-31 ASSESSMENT — MIFFLIN-ST. JEOR: SCORE: 1762.75

## 2021-12-31 NOTE — PROGRESS NOTES
"CC: Here for routine prenatal visit @ 31w0d   HPI: + FM, no ctx, no LOF, no VB.  No complaints.     PE: /70 (BP Location: Left arm, Patient Position: Chair, Cuff Size: Adult Regular)   Pulse 91   Temp 97.9  F (36.6  C) (Tympanic)   Resp 16   Ht 1.575 m (5' 2\")   Wt 110.9 kg (244 lb 9.6 oz)   LMP 05/28/2021   Breastfeeding No   BMI 44.74 kg/m     See OB flowsheet    A/P G1 @ 31w0d normal pregnancy    1. Routine prenatal care.  COVID restrictions and recommendations reviewed including iron supplementation.   2. Thickened NF @ 10 weeks: normal MFM scan.  Serial growth scans  3. BMI>40: serial growth scans, BPPs @ 36 weeks  4. Low lying placenta: resolved    RTC 2 weeks.      Iliana Shrestha M.D.    "

## 2021-12-31 NOTE — NURSING NOTE
"Initial /70 (BP Location: Left arm, Patient Position: Chair, Cuff Size: Adult Regular)   Pulse 91   Temp 97.9  F (36.6  C) (Tympanic)   Resp 16   Ht 1.575 m (5' 2\")   Wt 110.9 kg (244 lb 9.6 oz)   LMP 05/28/2021   Breastfeeding No   BMI 44.74 kg/m   Estimated body mass index is 44.74 kg/m  as calculated from the following:    Height as of this encounter: 1.575 m (5' 2\").    Weight as of this encounter: 110.9 kg (244 lb 9.6 oz). .    Beryl Wagoner LPN  "

## 2022-01-14 ENCOUNTER — PRENATAL OFFICE VISIT (OUTPATIENT)
Dept: OBGYN | Facility: CLINIC | Age: 37
End: 2022-01-14
Payer: COMMERCIAL

## 2022-01-14 ENCOUNTER — HOSPITAL ENCOUNTER (OUTPATIENT)
Dept: ULTRASOUND IMAGING | Facility: CLINIC | Age: 37
Discharge: HOME | End: 2022-01-14
Attending: OBSTETRICS & GYNECOLOGY | Admitting: OBSTETRICS & GYNECOLOGY
Payer: COMMERCIAL

## 2022-01-14 VITALS
HEART RATE: 97 BPM | HEIGHT: 62 IN | DIASTOLIC BLOOD PRESSURE: 72 MMHG | BODY MASS INDEX: 44.35 KG/M2 | WEIGHT: 241 LBS | TEMPERATURE: 98.3 F | RESPIRATION RATE: 12 BRPM | SYSTOLIC BLOOD PRESSURE: 109 MMHG

## 2022-01-14 DIAGNOSIS — Z34.03 ENCOUNTER FOR PRENATAL CARE IN THIRD TRIMESTER OF FIRST PREGNANCY: Primary | ICD-10-CM

## 2022-01-14 PROCEDURE — 99207 PR PRENATAL VISIT: CPT | Performed by: OBSTETRICS & GYNECOLOGY

## 2022-01-14 PROCEDURE — 76816 OB US FOLLOW-UP PER FETUS: CPT

## 2022-01-14 ASSESSMENT — MIFFLIN-ST. JEOR: SCORE: 1746.42

## 2022-01-14 NOTE — NURSING NOTE
"Initial /72 (BP Location: Left arm, Patient Position: Sitting, Cuff Size: Adult Large)   Pulse 97   Temp 98.3  F (36.8  C) (Tympanic)   Resp 12   Ht 1.575 m (5' 2\")   Wt 109.3 kg (241 lb)   LMP 05/28/2021   BMI 44.08 kg/m   Estimated body mass index is 44.08 kg/m  as calculated from the following:    Height as of this encounter: 1.575 m (5' 2\").    Weight as of this encounter: 109.3 kg (241 lb). .      "

## 2022-01-28 ENCOUNTER — PRENATAL OFFICE VISIT (OUTPATIENT)
Dept: OBGYN | Facility: CLINIC | Age: 37
End: 2022-01-28
Payer: COMMERCIAL

## 2022-01-28 VITALS
TEMPERATURE: 97.9 F | WEIGHT: 242.2 LBS | DIASTOLIC BLOOD PRESSURE: 73 MMHG | HEART RATE: 106 BPM | BODY MASS INDEX: 44.57 KG/M2 | HEIGHT: 62 IN | RESPIRATION RATE: 18 BRPM | SYSTOLIC BLOOD PRESSURE: 109 MMHG

## 2022-01-28 DIAGNOSIS — Z34.03 ENCOUNTER FOR PRENATAL CARE IN THIRD TRIMESTER OF FIRST PREGNANCY: Primary | ICD-10-CM

## 2022-01-28 PROCEDURE — 99207 PR PRENATAL VISIT: CPT | Performed by: OBSTETRICS & GYNECOLOGY

## 2022-01-28 ASSESSMENT — MIFFLIN-ST. JEOR: SCORE: 1751.86

## 2022-01-28 NOTE — PROGRESS NOTES
"CC: Here for routine prenatal visit @ 35w0d   HPI: + FM, no ctx, no LOF, no VB.  No complaints.     PE: /73 (BP Location: Left arm, Patient Position: Chair, Cuff Size: Adult Regular)   Pulse 106   Temp 97.9  F (36.6  C) (Tympanic)   Resp 18   Ht 1.575 m (5' 2\")   Wt 109.9 kg (242 lb 3.2 oz)   LMP 05/28/2021   Breastfeeding No   BMI 44.30 kg/m     See OB flowsheet    A/P G1 @ 35w0d normal pregnancy    1. Routine prenatal care.  COVID restrictions and recommendations reviewed including iron supplementation.   2. BMI >40: growth scan and BPPs after 36 weeks.     RTC 1 week    Iliana Shrestha M.D.    "

## 2022-01-28 NOTE — NURSING NOTE
"Initial /73 (BP Location: Left arm, Patient Position: Chair, Cuff Size: Adult Regular)   Pulse 106   Temp 97.9  F (36.6  C) (Tympanic)   Resp 18   Ht 1.575 m (5' 2\")   Wt 109.9 kg (242 lb 3.2 oz)   LMP 05/28/2021   Breastfeeding No   BMI 44.30 kg/m   Estimated body mass index is 44.3 kg/m  as calculated from the following:    Height as of this encounter: 1.575 m (5' 2\").    Weight as of this encounter: 109.9 kg (242 lb 3.2 oz). .    Vianey Gutierrez, SHILA    "

## 2022-02-04 ENCOUNTER — PRENATAL OFFICE VISIT (OUTPATIENT)
Dept: OBGYN | Facility: CLINIC | Age: 37
End: 2022-02-04
Payer: COMMERCIAL

## 2022-02-04 ENCOUNTER — HOSPITAL ENCOUNTER (OUTPATIENT)
Dept: ULTRASOUND IMAGING | Facility: CLINIC | Age: 37
Discharge: HOME | End: 2022-02-04
Attending: OBSTETRICS & GYNECOLOGY | Admitting: OBSTETRICS & GYNECOLOGY
Payer: COMMERCIAL

## 2022-02-04 VITALS
HEIGHT: 62 IN | SYSTOLIC BLOOD PRESSURE: 119 MMHG | DIASTOLIC BLOOD PRESSURE: 65 MMHG | WEIGHT: 242.2 LBS | RESPIRATION RATE: 16 BRPM | BODY MASS INDEX: 44.57 KG/M2 | HEART RATE: 104 BPM | TEMPERATURE: 97.5 F

## 2022-02-04 DIAGNOSIS — Z34.03 ENCOUNTER FOR PRENATAL CARE IN THIRD TRIMESTER OF FIRST PREGNANCY: Primary | ICD-10-CM

## 2022-02-04 DIAGNOSIS — Z34.03 ENCOUNTER FOR PRENATAL CARE IN THIRD TRIMESTER OF FIRST PREGNANCY: ICD-10-CM

## 2022-02-04 DIAGNOSIS — O99.213 OBESITY AFFECTING PREGNANCY IN THIRD TRIMESTER: ICD-10-CM

## 2022-02-04 PROCEDURE — 76819 FETAL BIOPHYS PROFIL W/O NST: CPT

## 2022-02-04 PROCEDURE — 87653 STREP B DNA AMP PROBE: CPT | Performed by: OBSTETRICS & GYNECOLOGY

## 2022-02-04 PROCEDURE — 99207 PR PRENATAL VISIT: CPT | Performed by: OBSTETRICS & GYNECOLOGY

## 2022-02-04 ASSESSMENT — MIFFLIN-ST. JEOR: SCORE: 1751.86

## 2022-02-04 NOTE — PROGRESS NOTES
"CC: Here for routine prenatal visit @ 36w0d   HPI: + FM, no ctx, no LOF, no VB.  No complaints.     PE: /65 (BP Location: Right arm, Patient Position: Chair, Cuff Size: Adult Regular)   Pulse 104   Temp 97.5  F (36.4  C) (Tympanic)   Resp 16   Ht 1.575 m (5' 2\")   Wt 109.9 kg (242 lb 3.2 oz)   LMP 05/28/2021   Breastfeeding No   BMI 44.30 kg/m     See OB flowsheet    Bpp 8/8, vtx, 136 fht    A/P G1 @ 36w0d normal pregnancy    1. Routine prenatal care.  COVID restrictions and recommendations reviewed including iron supplementation.  Labor precautions reviewed.   2. BMI > 40: weekly BPPs    RTC 1 week    Iliana Shrestha M.D.    "

## 2022-02-04 NOTE — NURSING NOTE
"Initial /65 (BP Location: Right arm, Patient Position: Chair, Cuff Size: Adult Regular)   Pulse 104   Temp 97.5  F (36.4  C) (Tympanic)   Resp 16   Ht 1.575 m (5' 2\")   Wt 109.9 kg (242 lb 3.2 oz)   LMP 05/28/2021   Breastfeeding No   BMI 44.30 kg/m   Estimated body mass index is 44.3 kg/m  as calculated from the following:    Height as of this encounter: 1.575 m (5' 2\").    Weight as of this encounter: 109.9 kg (242 lb 3.2 oz). .    Vianey Gutierrez, SHILA    "

## 2022-02-05 LAB
GP B STREP DNA SPEC QL NAA+PROBE: NEGATIVE
PATIENT PENICILLIN, AMOXICILLIN, CEPHALOSPORINS ALLERGY: NO

## 2022-02-11 ENCOUNTER — HOSPITAL ENCOUNTER (OUTPATIENT)
Dept: ULTRASOUND IMAGING | Facility: CLINIC | Age: 37
Discharge: HOME | End: 2022-02-11
Attending: OBSTETRICS & GYNECOLOGY | Admitting: OBSTETRICS & GYNECOLOGY
Payer: COMMERCIAL

## 2022-02-11 ENCOUNTER — TELEPHONE (OUTPATIENT)
Dept: OBGYN | Facility: CLINIC | Age: 37
End: 2022-02-11

## 2022-02-11 ENCOUNTER — PRENATAL OFFICE VISIT (OUTPATIENT)
Dept: OBGYN | Facility: CLINIC | Age: 37
End: 2022-02-11
Payer: COMMERCIAL

## 2022-02-11 VITALS
HEIGHT: 62 IN | TEMPERATURE: 97.7 F | BODY MASS INDEX: 45.12 KG/M2 | SYSTOLIC BLOOD PRESSURE: 119 MMHG | HEART RATE: 109 BPM | DIASTOLIC BLOOD PRESSURE: 68 MMHG | WEIGHT: 245.2 LBS | RESPIRATION RATE: 18 BRPM

## 2022-02-11 DIAGNOSIS — Z34.03 ENCOUNTER FOR PRENATAL CARE IN THIRD TRIMESTER OF FIRST PREGNANCY: ICD-10-CM

## 2022-02-11 DIAGNOSIS — O99.213 OBESITY AFFECTING PREGNANCY IN THIRD TRIMESTER: ICD-10-CM

## 2022-02-11 DIAGNOSIS — Z34.03 ENCOUNTER FOR PRENATAL CARE IN THIRD TRIMESTER OF FIRST PREGNANCY: Primary | ICD-10-CM

## 2022-02-11 PROCEDURE — 76819 FETAL BIOPHYS PROFIL W/O NST: CPT

## 2022-02-11 PROCEDURE — 76816 OB US FOLLOW-UP PER FETUS: CPT

## 2022-02-11 PROCEDURE — 99207 PR PRENATAL VISIT: CPT | Performed by: OBSTETRICS & GYNECOLOGY

## 2022-02-11 ASSESSMENT — MIFFLIN-ST. JEOR: SCORE: 1765.47

## 2022-02-11 NOTE — TELEPHONE ENCOUNTER
Pt dropped off a sheet with forms coversheet to be completed.  But there isn't anything on it to complete.  LM for pt to call back to see what to do with it.    -Rhea Cuello  [facility]

## 2022-02-11 NOTE — PROGRESS NOTES
"CC: Here for routine prenatal visit @ 37w0d   HPI: + FM, no ctx, no LOF, no VB.  No complaints.     PE: /68 (BP Location: Right arm, Patient Position: Chair, Cuff Size: Adult Large)   Pulse 109   Temp 97.7  F (36.5  C) (Tympanic)   Resp 18   Ht 1.575 m (5' 2\")   Wt 111.2 kg (245 lb 3.2 oz)   LMP 05/28/2021   Breastfeeding No   BMI 44.85 kg/m     See OB flowsheet    EFW 6#13 (57%ile), vtx, , 8/8 BPP    A/P G1 @ 37w0d normal pregnancy    1. Routine prenatal care.  COVID restrictions and recommendations reviewed including iron supplementation. Labor precautions reviewed.   2. BMI > 40: weekly BPPs     RTC 1 week      Iliana Shrestha M.D.      "

## 2022-02-11 NOTE — PROGRESS NOTES
Handed patient Breast Pump Insurance Questionnaire at today's visit.  Vianey Gutierrez on 2/11/2022 at 8:30 AM

## 2022-02-11 NOTE — NURSING NOTE
"Initial /68 (BP Location: Right arm, Patient Position: Chair, Cuff Size: Adult Large)   Pulse 109   Temp 97.7  F (36.5  C) (Tympanic)   Resp 18   Ht 1.575 m (5' 2\")   Wt 111.2 kg (245 lb 3.2 oz)   LMP 05/28/2021   Breastfeeding No   BMI 44.85 kg/m   Estimated body mass index is 44.85 kg/m  as calculated from the following:    Height as of this encounter: 1.575 m (5' 2\").    Weight as of this encounter: 111.2 kg (245 lb 3.2 oz). .    Vianey Gutierrez, SHILA    "

## 2022-02-18 ENCOUNTER — HOSPITAL ENCOUNTER (OUTPATIENT)
Dept: ULTRASOUND IMAGING | Facility: CLINIC | Age: 37
Discharge: HOME | End: 2022-02-18
Attending: OBSTETRICS & GYNECOLOGY | Admitting: OBSTETRICS & GYNECOLOGY
Payer: COMMERCIAL

## 2022-02-18 ENCOUNTER — PRENATAL OFFICE VISIT (OUTPATIENT)
Dept: OBGYN | Facility: CLINIC | Age: 37
End: 2022-02-18
Payer: COMMERCIAL

## 2022-02-18 VITALS
DIASTOLIC BLOOD PRESSURE: 65 MMHG | WEIGHT: 244.8 LBS | HEIGHT: 62 IN | RESPIRATION RATE: 12 BRPM | SYSTOLIC BLOOD PRESSURE: 100 MMHG | HEART RATE: 85 BPM | BODY MASS INDEX: 45.05 KG/M2

## 2022-02-18 DIAGNOSIS — Z34.03 ENCOUNTER FOR PRENATAL CARE IN THIRD TRIMESTER OF FIRST PREGNANCY: ICD-10-CM

## 2022-02-18 DIAGNOSIS — Z34.03 ENCOUNTER FOR PRENATAL CARE IN THIRD TRIMESTER OF FIRST PREGNANCY: Primary | ICD-10-CM

## 2022-02-18 DIAGNOSIS — O99.213 OBESITY AFFECTING PREGNANCY IN THIRD TRIMESTER: ICD-10-CM

## 2022-02-18 PROCEDURE — 99207 PR PRENATAL VISIT: CPT | Performed by: OBSTETRICS & GYNECOLOGY

## 2022-02-18 PROCEDURE — 76819 FETAL BIOPHYS PROFIL W/O NST: CPT

## 2022-02-18 NOTE — NURSING NOTE
"Initial /65 (BP Location: Right arm, Patient Position: Sitting, Cuff Size: Adult Large)   Pulse 85   Resp 12   Ht 1.575 m (5' 2\")   Wt 111 kg (244 lb 12.8 oz)   LMP 05/28/2021   BMI 44.77 kg/m   Estimated body mass index is 44.77 kg/m  as calculated from the following:    Height as of this encounter: 1.575 m (5' 2\").    Weight as of this encounter: 111 kg (244 lb 12.8 oz). .      "

## 2022-02-18 NOTE — PROGRESS NOTES
"[facility]    Return OB Note    CC: Return OB     Subjective:  Vianey is a 36 year old  at 38w0d   Denies vaginal bleeding, loss of fluid, or regular contractions. Good fetal movement.  Complaints today: None    Objective:  /65 (BP Location: Right arm, Patient Position: Sitting, Cuff Size: Adult Large)   Pulse 85   Resp 12   Ht 1.575 m (5' 2\")   Wt 111 kg (244 lb 12.8 oz)   LMP 2021   BMI 44.77 kg/m      Fundal height: 38cm  FHT: 140bpm  SVE: 0/-3, posterior     Assessment/Plan:   Encounter Diagnoses   Name Primary?     BMI 40.0-44.9, adult (H)      Encounter for prenatal care in third trimester of first pregnancy Yes       IUP at 38w0d  -Morbid obesity: serial growth US have been normal, continue weekly BPPs ( today)  -GBS negative  -Strict return precautions given    RTC 1 weeks    Neeta Vargas DO      "

## 2022-02-25 ENCOUNTER — HOSPITAL ENCOUNTER (OUTPATIENT)
Dept: ULTRASOUND IMAGING | Facility: CLINIC | Age: 37
Discharge: HOME | End: 2022-02-25
Attending: OBSTETRICS & GYNECOLOGY | Admitting: OBSTETRICS & GYNECOLOGY
Payer: COMMERCIAL

## 2022-02-25 ENCOUNTER — PRENATAL OFFICE VISIT (OUTPATIENT)
Dept: OBGYN | Facility: CLINIC | Age: 37
End: 2022-02-25
Payer: COMMERCIAL

## 2022-02-25 VITALS
WEIGHT: 244.6 LBS | DIASTOLIC BLOOD PRESSURE: 71 MMHG | BODY MASS INDEX: 45.01 KG/M2 | TEMPERATURE: 97.2 F | HEIGHT: 62 IN | HEART RATE: 87 BPM | SYSTOLIC BLOOD PRESSURE: 108 MMHG | RESPIRATION RATE: 16 BRPM

## 2022-02-25 DIAGNOSIS — Z34.03 ENCOUNTER FOR PRENATAL CARE IN THIRD TRIMESTER OF FIRST PREGNANCY: Primary | ICD-10-CM

## 2022-02-25 DIAGNOSIS — O99.213 OBESITY AFFECTING PREGNANCY IN THIRD TRIMESTER: ICD-10-CM

## 2022-02-25 DIAGNOSIS — Z34.03 ENCOUNTER FOR PRENATAL CARE IN THIRD TRIMESTER OF FIRST PREGNANCY: ICD-10-CM

## 2022-02-25 PROCEDURE — 76819 FETAL BIOPHYS PROFIL W/O NST: CPT

## 2022-02-25 PROCEDURE — 99207 PR PRENATAL VISIT: CPT | Performed by: OBSTETRICS & GYNECOLOGY

## 2022-02-25 NOTE — PROGRESS NOTES
"CC: Here for routine prenatal visit @ 39w0d   HPI: + FM, no ctx, no LOF, no VB.  No complaints.     PE: /71 (BP Location: Left arm, Patient Position: Chair, Cuff Size: Adult Large)   Pulse 87   Temp 97.2  F (36.2  C) (Tympanic)   Resp 16   Ht 1.575 m (5' 2\")   Wt 110.9 kg (244 lb 9.6 oz)   LMP 05/28/2021   Breastfeeding No   BMI 44.74 kg/m     See OB flowsheet    A/P G1 @ 39w0d normal pregnancy    1. Routine prenatal care.  COVID restrictions and recommendations reviewed including iron supplementation.  Labor precautions reviewed.     RTC 1 week    Iliana Shrestha M.D.    "

## 2022-02-25 NOTE — NURSING NOTE
"Initial /71 (BP Location: Left arm, Patient Position: Chair, Cuff Size: Adult Large)   Pulse 87   Temp 97.2  F (36.2  C) (Tympanic)   Resp 16   Ht 1.575 m (5' 2\")   Wt 110.9 kg (244 lb 9.6 oz)   LMP 05/28/2021   Breastfeeding No   BMI 44.74 kg/m   Estimated body mass index is 44.74 kg/m  as calculated from the following:    Height as of this encounter: 1.575 m (5' 2\").    Weight as of this encounter: 110.9 kg (244 lb 9.6 oz). .    Vianey Gutierrez, SHILA    "

## 2022-03-04 ENCOUNTER — PRENATAL OFFICE VISIT (OUTPATIENT)
Dept: OBGYN | Facility: CLINIC | Age: 37
End: 2022-03-04
Payer: COMMERCIAL

## 2022-03-04 ENCOUNTER — HOSPITAL ENCOUNTER (OUTPATIENT)
Dept: ULTRASOUND IMAGING | Facility: CLINIC | Age: 37
Discharge: HOME | End: 2022-03-04
Attending: OBSTETRICS & GYNECOLOGY | Admitting: OBSTETRICS & GYNECOLOGY
Payer: COMMERCIAL

## 2022-03-04 VITALS
SYSTOLIC BLOOD PRESSURE: 111 MMHG | TEMPERATURE: 97.8 F | HEART RATE: 95 BPM | DIASTOLIC BLOOD PRESSURE: 74 MMHG | WEIGHT: 249.6 LBS | RESPIRATION RATE: 18 BRPM | BODY MASS INDEX: 45.93 KG/M2 | HEIGHT: 62 IN

## 2022-03-04 DIAGNOSIS — Z11.52 ENCOUNTER FOR PREPROCEDURE SCREENING LABORATORY TESTING FOR COVID-19: ICD-10-CM

## 2022-03-04 DIAGNOSIS — Z34.03 ENCOUNTER FOR PRENATAL CARE IN THIRD TRIMESTER OF FIRST PREGNANCY: ICD-10-CM

## 2022-03-04 DIAGNOSIS — Z01.812 ENCOUNTER FOR PREPROCEDURE SCREENING LABORATORY TESTING FOR COVID-19: ICD-10-CM

## 2022-03-04 DIAGNOSIS — Z34.03 ENCOUNTER FOR PRENATAL CARE IN THIRD TRIMESTER OF FIRST PREGNANCY: Primary | ICD-10-CM

## 2022-03-04 DIAGNOSIS — O99.213 OBESITY AFFECTING PREGNANCY IN THIRD TRIMESTER: ICD-10-CM

## 2022-03-04 PROCEDURE — 99207 PR PRENATAL VISIT: CPT | Performed by: OBSTETRICS & GYNECOLOGY

## 2022-03-04 PROCEDURE — 76819 FETAL BIOPHYS PROFIL W/O NST: CPT

## 2022-03-04 PROCEDURE — 59426 ANTEPARTUM CARE ONLY: CPT | Performed by: OBSTETRICS & GYNECOLOGY

## 2022-03-04 RX ORDER — HYDROXYZINE HYDROCHLORIDE 50 MG/1
50 TABLET, FILM COATED ORAL
Status: CANCELLED | OUTPATIENT
Start: 2022-03-04

## 2022-03-04 RX ORDER — OXYTOCIN/0.9 % SODIUM CHLORIDE 30/500 ML
100-340 PLASTIC BAG, INJECTION (ML) INTRAVENOUS CONTINUOUS PRN
Status: CANCELLED | OUTPATIENT
Start: 2022-03-04

## 2022-03-04 RX ORDER — CARBOPROST TROMETHAMINE 250 UG/ML
250 INJECTION, SOLUTION INTRAMUSCULAR
Status: CANCELLED | OUTPATIENT
Start: 2022-03-04

## 2022-03-04 RX ORDER — LIDOCAINE 40 MG/G
CREAM TOPICAL
Status: CANCELLED | OUTPATIENT
Start: 2022-03-04

## 2022-03-04 RX ORDER — KETOROLAC TROMETHAMINE 30 MG/ML
30 INJECTION, SOLUTION INTRAMUSCULAR; INTRAVENOUS
Status: CANCELLED | OUTPATIENT
Start: 2022-03-04 | End: 2022-03-09

## 2022-03-04 RX ORDER — TRANEXAMIC ACID 10 MG/ML
1 INJECTION, SOLUTION INTRAVENOUS EVERY 30 MIN PRN
Status: CANCELLED | OUTPATIENT
Start: 2022-03-04

## 2022-03-04 RX ORDER — OXYTOCIN 10 [USP'U]/ML
10 INJECTION, SOLUTION INTRAMUSCULAR; INTRAVENOUS
Status: CANCELLED | OUTPATIENT
Start: 2022-03-04

## 2022-03-04 RX ORDER — METOCLOPRAMIDE 10 MG/1
10 TABLET ORAL EVERY 6 HOURS PRN
Status: CANCELLED | OUTPATIENT
Start: 2022-03-04

## 2022-03-04 RX ORDER — ACETAMINOPHEN 325 MG/1
650 TABLET ORAL EVERY 4 HOURS PRN
Status: CANCELLED | OUTPATIENT
Start: 2022-03-04

## 2022-03-04 RX ORDER — MISOPROSTOL 200 UG/1
800 TABLET ORAL
Status: CANCELLED | OUTPATIENT
Start: 2022-03-04

## 2022-03-04 RX ORDER — IBUPROFEN 200 MG
600 TABLET ORAL
Status: CANCELLED | OUTPATIENT
Start: 2022-03-04 | End: 2022-03-09

## 2022-03-04 RX ORDER — MISOPROSTOL 200 UG/1
400 TABLET ORAL
Status: CANCELLED | OUTPATIENT
Start: 2022-03-04

## 2022-03-04 RX ORDER — ONDANSETRON 4 MG/1
4 TABLET, ORALLY DISINTEGRATING ORAL EVERY 6 HOURS PRN
Status: CANCELLED | OUTPATIENT
Start: 2022-03-04

## 2022-03-04 RX ORDER — SODIUM CHLORIDE, SODIUM LACTATE, POTASSIUM CHLORIDE, CALCIUM CHLORIDE 600; 310; 30; 20 MG/100ML; MG/100ML; MG/100ML; MG/100ML
INJECTION, SOLUTION INTRAVENOUS CONTINUOUS
Status: CANCELLED | OUTPATIENT
Start: 2022-03-04

## 2022-03-04 RX ORDER — METHYLERGONOVINE MALEATE 0.2 MG/ML
200 INJECTION INTRAVENOUS
Status: CANCELLED | OUTPATIENT
Start: 2022-03-04

## 2022-03-04 RX ORDER — MORPHINE SULFATE 10 MG/ML
10 INJECTION, SOLUTION INTRAMUSCULAR; INTRAVENOUS
Status: CANCELLED | OUTPATIENT
Start: 2022-03-04

## 2022-03-04 RX ORDER — METOCLOPRAMIDE HYDROCHLORIDE 5 MG/ML
10 INJECTION INTRAMUSCULAR; INTRAVENOUS EVERY 6 HOURS PRN
Status: CANCELLED | OUTPATIENT
Start: 2022-03-04

## 2022-03-04 RX ORDER — MISOPROSTOL 100 UG/1
25 TABLET ORAL
Status: CANCELLED | OUTPATIENT
Start: 2022-03-04

## 2022-03-04 RX ORDER — TERBUTALINE SULFATE 1 MG/ML
0.25 INJECTION, SOLUTION SUBCUTANEOUS
Status: CANCELLED | OUTPATIENT
Start: 2022-03-04

## 2022-03-04 RX ORDER — OXYTOCIN/0.9 % SODIUM CHLORIDE 30/500 ML
340 PLASTIC BAG, INJECTION (ML) INTRAVENOUS CONTINUOUS PRN
Status: CANCELLED | OUTPATIENT
Start: 2022-03-04

## 2022-03-04 RX ORDER — ONDANSETRON 2 MG/ML
4 INJECTION INTRAMUSCULAR; INTRAVENOUS EVERY 6 HOURS PRN
Status: CANCELLED | OUTPATIENT
Start: 2022-03-04

## 2022-03-04 NOTE — PROGRESS NOTES
"[facility]    Return OB Note    CC: Return OB     Subjective:  Vianey is a 36 year old  at 40w0d   Denies vaginal bleeding, loss of fluid, or regular contractions. Good fetal movement.  Complaints today: Uncomfortable, ready to be done.    Objective:  /74 (BP Location: Left arm, Cuff Size: Adult Large)   Pulse 95   Temp 97.8  F (36.6  C)   Resp 18   Ht 1.575 m (5' 2\")   Wt 113.2 kg (249 lb 9.6 oz)   LMP 2021   BMI 45.65 kg/m      Fundal height: 39cm  FHT: 140bpm  SVE: 0/50/-2    Assessment/Plan:   Encounter Diagnoses   Name Primary?     Encounter for prenatal care in third trimester of first pregnancy Yes     BMI 40.0-44.9, adult (H)        IUP at 40w0d  -Morbid obesity: serial growth US have been normal, continue weekly BPPs, has one more scheduled for next week if still pregnant  -GBS negative  -IOL scheduled for post dates on 3/10/22.   -Strict return precautions given    RTC post partum    Neeta Vargas DO    "

## 2022-03-08 ENCOUNTER — HOSPITAL ENCOUNTER (OUTPATIENT)
Dept: ULTRASOUND IMAGING | Facility: CLINIC | Age: 37
Discharge: HOME | End: 2022-03-08
Attending: OBSTETRICS & GYNECOLOGY | Admitting: OBSTETRICS & GYNECOLOGY
Payer: COMMERCIAL

## 2022-03-08 ENCOUNTER — ALLIED HEALTH/NURSE VISIT (OUTPATIENT)
Dept: UROLOGY | Facility: CLINIC | Age: 37
End: 2022-03-08
Payer: COMMERCIAL

## 2022-03-08 DIAGNOSIS — O99.213 OBESITY AFFECTING PREGNANCY IN THIRD TRIMESTER: ICD-10-CM

## 2022-03-08 DIAGNOSIS — Z11.52 ENCOUNTER FOR PREPROCEDURE SCREENING LABORATORY TESTING FOR COVID-19: ICD-10-CM

## 2022-03-08 DIAGNOSIS — Z34.03 ENCOUNTER FOR PRENATAL CARE IN THIRD TRIMESTER OF FIRST PREGNANCY: ICD-10-CM

## 2022-03-08 DIAGNOSIS — Z01.812 ENCOUNTER FOR PREPROCEDURE SCREENING LABORATORY TESTING FOR COVID-19: ICD-10-CM

## 2022-03-08 PROCEDURE — U0003 INFECTIOUS AGENT DETECTION BY NUCLEIC ACID (DNA OR RNA); SEVERE ACUTE RESPIRATORY SYNDROME CORONAVIRUS 2 (SARS-COV-2) (CORONAVIRUS DISEASE [COVID-19]), AMPLIFIED PROBE TECHNIQUE, MAKING USE OF HIGH THROUGHPUT TECHNOLOGIES AS DESCRIBED BY CMS-2020-01-R: HCPCS

## 2022-03-08 PROCEDURE — 76819 FETAL BIOPHYS PROFIL W/O NST: CPT

## 2022-03-08 PROCEDURE — U0005 INFEC AGEN DETEC AMPLI PROBE: HCPCS

## 2022-03-09 LAB — SARS-COV-2 RNA RESP QL NAA+PROBE: NEGATIVE

## 2022-03-10 ENCOUNTER — HOSPITAL ENCOUNTER (INPATIENT)
Facility: CLINIC | Age: 37
LOS: 3 days | Discharge: HOME | End: 2022-03-13
Attending: OBSTETRICS & GYNECOLOGY | Admitting: OBSTETRICS & GYNECOLOGY
Payer: COMMERCIAL

## 2022-03-10 DIAGNOSIS — Z37.9 VACUUM-ASSISTED VAGINAL DELIVERY: ICD-10-CM

## 2022-03-10 DIAGNOSIS — Z34.03 ENCOUNTER FOR PRENATAL CARE IN THIRD TRIMESTER OF FIRST PREGNANCY: Primary | ICD-10-CM

## 2022-03-10 PROBLEM — Z11.52 ENCOUNTER FOR PREPROCEDURE SCREENING LABORATORY TESTING FOR COVID-19: Status: ACTIVE | Noted: 2022-03-10

## 2022-03-10 PROBLEM — Z01.812 ENCOUNTER FOR PREPROCEDURE SCREENING LABORATORY TESTING FOR COVID-19: Status: ACTIVE | Noted: 2022-03-10

## 2022-03-10 LAB
ABO/RH(D): NORMAL
ANTIBODY SCREEN: NEGATIVE
BASOPHILS # BLD AUTO: 0 10E3/UL (ref 0–0.2)
BASOPHILS NFR BLD AUTO: 0 %
EOSINOPHIL # BLD AUTO: 0 10E3/UL (ref 0–0.7)
EOSINOPHIL NFR BLD AUTO: 0 %
ERYTHROCYTE [DISTWIDTH] IN BLOOD BY AUTOMATED COUNT: 14.3 % (ref 10–15)
HCT VFR BLD AUTO: 36.2 % (ref 35–47)
HGB BLD-MCNC: 12.1 G/DL (ref 11.7–15.7)
IMM GRANULOCYTES # BLD: 0.1 10E3/UL
IMM GRANULOCYTES NFR BLD: 1 %
LYMPHOCYTES # BLD AUTO: 1.8 10E3/UL (ref 0.8–5.3)
LYMPHOCYTES NFR BLD AUTO: 16 %
MCH RBC QN AUTO: 30.2 PG (ref 26.5–33)
MCHC RBC AUTO-ENTMCNC: 33.4 G/DL (ref 31.5–36.5)
MCV RBC AUTO: 90 FL (ref 78–100)
MONOCYTES # BLD AUTO: 0.7 10E3/UL (ref 0–1.3)
MONOCYTES NFR BLD AUTO: 6 %
NEUTROPHILS # BLD AUTO: 8.8 10E3/UL (ref 1.6–8.3)
NEUTROPHILS NFR BLD AUTO: 77 %
NRBC # BLD AUTO: 0 10E3/UL
NRBC BLD AUTO-RTO: 0 /100
PLATELET # BLD AUTO: 229 10E3/UL (ref 150–450)
RBC # BLD AUTO: 4.01 10E6/UL (ref 3.8–5.2)
SPECIMEN EXPIRATION DATE: NORMAL
T PALLIDUM AB SER QL: NONREACTIVE
WBC # BLD AUTO: 11.4 10E3/UL (ref 4–11)

## 2022-03-10 PROCEDURE — 86850 RBC ANTIBODY SCREEN: CPT | Performed by: OBSTETRICS & GYNECOLOGY

## 2022-03-10 PROCEDURE — 36415 COLL VENOUS BLD VENIPUNCTURE: CPT | Performed by: OBSTETRICS & GYNECOLOGY

## 2022-03-10 PROCEDURE — 250N000013 HC RX MED GY IP 250 OP 250 PS 637: Performed by: OBSTETRICS & GYNECOLOGY

## 2022-03-10 PROCEDURE — 120N000001 HC R&B MED SURG/OB

## 2022-03-10 PROCEDURE — 250N000011 HC RX IP 250 OP 636: Performed by: OBSTETRICS & GYNECOLOGY

## 2022-03-10 PROCEDURE — 86901 BLOOD TYPING SEROLOGIC RH(D): CPT | Performed by: OBSTETRICS & GYNECOLOGY

## 2022-03-10 PROCEDURE — 86780 TREPONEMA PALLIDUM: CPT | Performed by: OBSTETRICS & GYNECOLOGY

## 2022-03-10 PROCEDURE — 85025 COMPLETE CBC W/AUTO DIFF WBC: CPT | Performed by: OBSTETRICS & GYNECOLOGY

## 2022-03-10 RX ORDER — MISOPROSTOL 200 UG/1
400 TABLET ORAL
Status: DISCONTINUED | OUTPATIENT
Start: 2022-03-10 | End: 2022-03-11

## 2022-03-10 RX ORDER — CARBOPROST TROMETHAMINE 250 UG/ML
250 INJECTION, SOLUTION INTRAMUSCULAR
Status: DISCONTINUED | OUTPATIENT
Start: 2022-03-10 | End: 2022-03-11

## 2022-03-10 RX ORDER — ONDANSETRON 4 MG/1
4 TABLET, ORALLY DISINTEGRATING ORAL EVERY 6 HOURS PRN
Status: DISCONTINUED | OUTPATIENT
Start: 2022-03-10 | End: 2022-03-11

## 2022-03-10 RX ORDER — OXYTOCIN 10 [USP'U]/ML
10 INJECTION, SOLUTION INTRAMUSCULAR; INTRAVENOUS
Status: DISCONTINUED | OUTPATIENT
Start: 2022-03-10 | End: 2022-03-11

## 2022-03-10 RX ORDER — ONDANSETRON 2 MG/ML
4 INJECTION INTRAMUSCULAR; INTRAVENOUS EVERY 6 HOURS PRN
Status: DISCONTINUED | OUTPATIENT
Start: 2022-03-10 | End: 2022-03-11

## 2022-03-10 RX ORDER — KETOROLAC TROMETHAMINE 30 MG/ML
30 INJECTION, SOLUTION INTRAMUSCULAR; INTRAVENOUS
Status: DISCONTINUED | OUTPATIENT
Start: 2022-03-10 | End: 2022-03-11

## 2022-03-10 RX ORDER — MORPHINE SULFATE 10 MG/ML
10 INJECTION, SOLUTION INTRAMUSCULAR; INTRAVENOUS
Status: COMPLETED | OUTPATIENT
Start: 2022-03-10 | End: 2022-03-10

## 2022-03-10 RX ORDER — IBUPROFEN 600 MG/1
600 TABLET, FILM COATED ORAL
Status: DISCONTINUED | OUTPATIENT
Start: 2022-03-10 | End: 2022-03-11

## 2022-03-10 RX ORDER — MISOPROSTOL 100 UG/1
25 TABLET ORAL
Status: COMPLETED | OUTPATIENT
Start: 2022-03-10 | End: 2022-03-11

## 2022-03-10 RX ORDER — METOCLOPRAMIDE 10 MG/1
10 TABLET ORAL EVERY 6 HOURS PRN
Status: DISCONTINUED | OUTPATIENT
Start: 2022-03-10 | End: 2022-03-11

## 2022-03-10 RX ORDER — SODIUM CHLORIDE, SODIUM LACTATE, POTASSIUM CHLORIDE, CALCIUM CHLORIDE 600; 310; 30; 20 MG/100ML; MG/100ML; MG/100ML; MG/100ML
INJECTION, SOLUTION INTRAVENOUS CONTINUOUS
Status: DISCONTINUED | OUTPATIENT
Start: 2022-03-10 | End: 2022-03-11

## 2022-03-10 RX ORDER — TERBUTALINE SULFATE 1 MG/ML
0.25 INJECTION, SOLUTION SUBCUTANEOUS
Status: DISCONTINUED | OUTPATIENT
Start: 2022-03-10 | End: 2022-03-11

## 2022-03-10 RX ORDER — OXYTOCIN/0.9 % SODIUM CHLORIDE 30/500 ML
100-340 PLASTIC BAG, INJECTION (ML) INTRAVENOUS CONTINUOUS PRN
Status: DISCONTINUED | OUTPATIENT
Start: 2022-03-10 | End: 2022-03-11

## 2022-03-10 RX ORDER — METHYLERGONOVINE MALEATE 0.2 MG/ML
200 INJECTION INTRAVENOUS
Status: DISCONTINUED | OUTPATIENT
Start: 2022-03-10 | End: 2022-03-11

## 2022-03-10 RX ORDER — METOCLOPRAMIDE HYDROCHLORIDE 5 MG/ML
10 INJECTION INTRAMUSCULAR; INTRAVENOUS EVERY 6 HOURS PRN
Status: DISCONTINUED | OUTPATIENT
Start: 2022-03-10 | End: 2022-03-11

## 2022-03-10 RX ORDER — LIDOCAINE 40 MG/G
CREAM TOPICAL
Status: DISCONTINUED | OUTPATIENT
Start: 2022-03-10 | End: 2022-03-11

## 2022-03-10 RX ORDER — HYDROXYZINE HYDROCHLORIDE 50 MG/1
50 TABLET, FILM COATED ORAL
Status: DISCONTINUED | OUTPATIENT
Start: 2022-03-10 | End: 2022-03-11

## 2022-03-10 RX ORDER — ACETAMINOPHEN 325 MG/1
650 TABLET ORAL EVERY 4 HOURS PRN
Status: DISCONTINUED | OUTPATIENT
Start: 2022-03-10 | End: 2022-03-11

## 2022-03-10 RX ORDER — OXYTOCIN/0.9 % SODIUM CHLORIDE 30/500 ML
340 PLASTIC BAG, INJECTION (ML) INTRAVENOUS CONTINUOUS PRN
Status: DISCONTINUED | OUTPATIENT
Start: 2022-03-10 | End: 2022-03-11

## 2022-03-10 RX ORDER — MISOPROSTOL 200 UG/1
800 TABLET ORAL
Status: DISCONTINUED | OUTPATIENT
Start: 2022-03-10 | End: 2022-03-11

## 2022-03-10 RX ORDER — TRANEXAMIC ACID 10 MG/ML
1 INJECTION, SOLUTION INTRAVENOUS EVERY 30 MIN PRN
Status: DISCONTINUED | OUTPATIENT
Start: 2022-03-10 | End: 2022-03-11

## 2022-03-10 RX ADMIN — MISOPROSTOL 25 MCG: 100 TABLET ORAL at 21:16

## 2022-03-10 RX ADMIN — MISOPROSTOL 25 MCG: 100 TABLET ORAL at 16:46

## 2022-03-10 RX ADMIN — MISOPROSTOL 25 MCG: 100 TABLET ORAL at 14:42

## 2022-03-10 RX ADMIN — MORPHINE SULFATE 10 MG: 10 INJECTION INTRAVENOUS at 22:38

## 2022-03-10 RX ADMIN — MISOPROSTOL 25 MCG: 100 TABLET ORAL at 08:40

## 2022-03-10 RX ADMIN — MISOPROSTOL 25 MCG: 100 TABLET ORAL at 23:27

## 2022-03-10 RX ADMIN — MISOPROSTOL 25 MCG: 100 TABLET ORAL at 10:40

## 2022-03-10 RX ADMIN — HYDROXYZINE HYDROCHLORIDE 50 MG: 50 TABLET, FILM COATED ORAL at 21:32

## 2022-03-10 RX ADMIN — MISOPROSTOL 25 MCG: 100 TABLET ORAL at 18:43

## 2022-03-10 RX ADMIN — MISOPROSTOL 25 MCG: 100 TABLET ORAL at 12:40

## 2022-03-10 NOTE — H&P
[facility] Labor and Delivery History and Physical    Vianey Cortez MRN# [id number]   Age: 36 year old YOB: 1985     Date of Admission:  3/10/2022    Primary care provider: Suyapa Huynh            Chief Complaint:   Vianey Cortez is a 36 year old female who is 40w6d pregnant and being admitted for induction of labor, indication post-dates.          Pregnancy history:     OBSTETRIC HISTORY:    OB History    Para Term  AB Living   1 0 0 0 0 0   SAB IAB Ectopic Multiple Live Births   0 0 0 0 0      # Outcome Date GA Lbr Scott/2nd Weight Sex Delivery Anes PTL Lv   1 Current                EDC: Estimated Date of Delivery: 3/4/22    Prenatal Labs:   Lab Results   Component Value Date    AS Negative 03/10/2022    HEPBANG Nonreactive 2021    HGB 12.1 03/10/2022       GBS Status:   No results found for: GBS    Active Problem List  Patient Active Problem List   Diagnosis     Traumatic brain injury with prolonged (more than 24 hours) loss of consciousness with return to pre-existing conscious level (H)     Impulsiveness, due to TBI     CARDIOVASCULAR SCREENING; LDL GOAL LESS THAN 160     Expressive aphasia     Prenatal care, first pregnancy     Thickening of nuchal fold of fetus     Family hx of colon cancer     BMI 40.0-44.9, adult (H)     Encounter for prenatal care in third trimester of first pregnancy     Encounter for preprocedure screening laboratory testing for COVID-19       Medication Prior to Admission  Medications Prior to Admission   Medication Sig Dispense Refill Last Dose     doxylamine (UNISOM) 25 MG TABS tablet Take 25 mg by mouth At Bedtime    3/9/2022 at      Prenatal Vit-Fe Fumarate-FA (PRENATAL MULTIVITAMIN W/IRON) 27-0.8 MG tablet Take 1 tablet by mouth daily    3/9/2022 at      pyridOXINE (VITAMIN B6) 100 MG TABS Take 25 mg by mouth daily    3/9/2022 at      TYLENOL 325 MG OR TABS AS NEEDED FOR PAIN (Patient not taking: Reported on  8/6/2021)      .        Maternal Past Medical History:     Past Medical History:   Diagnosis Date     Infertility management 5/7/2021     Intracranial hemorrhage (H) 7/5/2015     Seizure (H)     x1 after her brain surgery                       Family History:   I have reviewed this patient's family history            Social History:   I have reviewed this patient's social history         Review of Systems:   The Review of Systems is negative other than noted in the HPI          Physical Exam:   Vitals were reviewed  All vitals stable  Temp: 97.7  F (36.5  C) Temp src: Oral BP: 120/67 Pulse: 90   Resp: 18        Constitutional:   awake, alert, cooperative, no apparent distress, and appears stated age     Lungs:   No increased work of breathing, good air exchange, clear to auscultation bilaterally, no crackles or wheezing     Cardiovascular:   Normal apical impulse, regular rate and rhythm, normal S1 and S2, no S3 or S4, and no murmur noted     Abdomen:   No scars, normal bowel sounds, soft, non-distended, non-tender, no masses palpated, no hepatosplenomegaly and Gravid     Genitounirinary:   External Genitalia:  General appearance; normal     Musculoskeletal:   There is no redness, warmth, or swelling of the joints.  Full range of motion noted.  Motor strength is 5 out of 5 all extremities bilaterally.  Tone is normal.     Neurologic:   Awake, alert, oriented to name, place and time.  Cranial nerves II-XII are grossly intact.  Motor is 5 out of 5 bilaterally.  Cerebellar finger to nose, heel to shin intact.  Sensory is intact.  Babinski down going, Romberg negative, and gait is normal.     Neuropsychiatric:   General: normal, calm and normal eye contact  Level of consciousness: alert / normal  Affect: normal  Orientation: oriented to self, place, time and situation  Memory and insight: normal, memory for past and recent events intact and thought process normal     Skin:   no bruising or bleeding, normal skin color,  texture, turgor and no redness, warmth, or swelling      Cervix:   Membranes: intact   Dilation: FT   Effacement: 50%   Station:-2   Consistency: soft   Position: Mid  Presentation:Cephalic  Fetal Heart Rate Tracing: reactive and reassuring, Tier 1 (normal)  Tocometer: external monitor and inadequate                       Assessment:   Vianey Cortez is a 40w6d pregnant female admitted with induction of labor, indication post-dates.          Plan:   Admit - see IP orders  cervical ripening with misoprostol  Labor induction with Pitocin  Pain medication Epidural  Anticipate   Discussed indications for   section    Burton Schrader MD

## 2022-03-10 NOTE — PLAN OF CARE
Patient arrived from home with  Sung for induction of labor with misoprostol. COVID negative. Difficult IV stick. SVE: FT/50/-2. History of a TBI with month long hospitalization in 2015; only residual effect is loss of smell    -Surprise gender!  -Planning for a epidural   -Sung () to cut cord and announce the gender  -Planning to breastfeed  -Has breast pump at home  -Baby meds are okay  -Planning a circumcision if a boy  -Has New Family book and paperwork at the bedside

## 2022-03-10 NOTE — PLAN OF CARE
Vitals stable; denies pain. Up ad ren. Tolerating regular diet. Sung at bedside, supportive. Misoprostol given per MAR; total of 4 doses on this shift. Irregular contractions that patient is not feeling throughout the shift. Category 1 fetal tracing. Intact; no vaginal bleeding. Plan of care reviewed with patient and spouse.

## 2022-03-11 ENCOUNTER — ANESTHESIA (OUTPATIENT)
Dept: OBGYN | Facility: CLINIC | Age: 37
End: 2022-03-11
Payer: COMMERCIAL

## 2022-03-11 ENCOUNTER — ANESTHESIA EVENT (OUTPATIENT)
Dept: OBGYN | Facility: CLINIC | Age: 37
End: 2022-03-11
Payer: COMMERCIAL

## 2022-03-11 PROBLEM — Z37.9 VACUUM-ASSISTED VAGINAL DELIVERY: Status: ACTIVE | Noted: 2022-03-11

## 2022-03-11 PROCEDURE — 250N000009 HC RX 250: Performed by: OBSTETRICS & GYNECOLOGY

## 2022-03-11 PROCEDURE — 250N000011 HC RX IP 250 OP 636: Performed by: NURSE ANESTHETIST, CERTIFIED REGISTERED

## 2022-03-11 PROCEDURE — 3E033VJ INTRODUCTION OF OTHER HORMONE INTO PERIPHERAL VEIN, PERCUTANEOUS APPROACH: ICD-10-PCS | Performed by: OBSTETRICS & GYNECOLOGY

## 2022-03-11 PROCEDURE — 370N000003 HC ANESTHESIA WARD SERVICE

## 2022-03-11 PROCEDURE — 250N000009 HC RX 250: Performed by: NURSE ANESTHETIST, CERTIFIED REGISTERED

## 2022-03-11 PROCEDURE — 59410 OBSTETRICAL CARE: CPT | Performed by: OBSTETRICS & GYNECOLOGY

## 2022-03-11 PROCEDURE — 258N000003 HC RX IP 258 OP 636: Performed by: OBSTETRICS & GYNECOLOGY

## 2022-03-11 PROCEDURE — 120N000001 HC R&B MED SURG/OB

## 2022-03-11 PROCEDURE — 3E0R3BZ INTRODUCTION OF ANESTHETIC AGENT INTO SPINAL CANAL, PERCUTANEOUS APPROACH: ICD-10-PCS | Performed by: NURSE ANESTHETIST, CERTIFIED REGISTERED

## 2022-03-11 PROCEDURE — 0KQM0ZZ REPAIR PERINEUM MUSCLE, OPEN APPROACH: ICD-10-PCS | Performed by: OBSTETRICS & GYNECOLOGY

## 2022-03-11 PROCEDURE — 250N000013 HC RX MED GY IP 250 OP 250 PS 637: Performed by: OBSTETRICS & GYNECOLOGY

## 2022-03-11 PROCEDURE — 722N000001 HC LABOR CARE VAGINAL DELIVERY SINGLE

## 2022-03-11 PROCEDURE — 00HU33Z INSERTION OF INFUSION DEVICE INTO SPINAL CANAL, PERCUTANEOUS APPROACH: ICD-10-PCS | Performed by: NURSE ANESTHETIST, CERTIFIED REGISTERED

## 2022-03-11 PROCEDURE — 10907ZC DRAINAGE OF AMNIOTIC FLUID, THERAPEUTIC FROM PRODUCTS OF CONCEPTION, VIA NATURAL OR ARTIFICIAL OPENING: ICD-10-PCS | Performed by: OBSTETRICS & GYNECOLOGY

## 2022-03-11 RX ORDER — OXYTOCIN/0.9 % SODIUM CHLORIDE 30/500 ML
340 PLASTIC BAG, INJECTION (ML) INTRAVENOUS CONTINUOUS PRN
Status: DISCONTINUED | OUTPATIENT
Start: 2022-03-11 | End: 2022-03-13 | Stop reason: HOSPADM

## 2022-03-11 RX ORDER — MISOPROSTOL 200 UG/1
800 TABLET ORAL
Status: DISCONTINUED | OUTPATIENT
Start: 2022-03-11 | End: 2022-03-13 | Stop reason: HOSPADM

## 2022-03-11 RX ORDER — CARBOPROST TROMETHAMINE 250 UG/ML
250 INJECTION, SOLUTION INTRAMUSCULAR
Status: DISCONTINUED | OUTPATIENT
Start: 2022-03-11 | End: 2022-03-13 | Stop reason: HOSPADM

## 2022-03-11 RX ORDER — ONDANSETRON 4 MG/1
4 TABLET, ORALLY DISINTEGRATING ORAL EVERY 6 HOURS PRN
Status: DISCONTINUED | OUTPATIENT
Start: 2022-03-11 | End: 2022-03-11

## 2022-03-11 RX ORDER — METHYLERGONOVINE MALEATE 0.2 MG/ML
200 INJECTION INTRAVENOUS
Status: DISCONTINUED | OUTPATIENT
Start: 2022-03-11 | End: 2022-03-13 | Stop reason: HOSPADM

## 2022-03-11 RX ORDER — NALOXONE HYDROCHLORIDE 0.4 MG/ML
0.4 INJECTION, SOLUTION INTRAMUSCULAR; INTRAVENOUS; SUBCUTANEOUS
Status: DISCONTINUED | OUTPATIENT
Start: 2022-03-11 | End: 2022-03-13 | Stop reason: HOSPADM

## 2022-03-11 RX ORDER — LIDOCAINE HYDROCHLORIDE AND EPINEPHRINE 15; 5 MG/ML; UG/ML
INJECTION, SOLUTION EPIDURAL PRN
Status: DISCONTINUED | OUTPATIENT
Start: 2022-03-11 | End: 2022-03-11

## 2022-03-11 RX ORDER — ONDANSETRON 2 MG/ML
4 INJECTION INTRAMUSCULAR; INTRAVENOUS EVERY 6 HOURS PRN
Status: DISCONTINUED | OUTPATIENT
Start: 2022-03-11 | End: 2022-03-11

## 2022-03-11 RX ORDER — LIDOCAINE HYDROCHLORIDE 10 MG/ML
INJECTION, SOLUTION INFILTRATION; PERINEURAL PRN
Status: DISCONTINUED | OUTPATIENT
Start: 2022-03-11 | End: 2022-03-11

## 2022-03-11 RX ORDER — LIDOCAINE 40 MG/G
CREAM TOPICAL
Status: DISCONTINUED | OUTPATIENT
Start: 2022-03-11 | End: 2022-03-11

## 2022-03-11 RX ORDER — ACETAMINOPHEN 325 MG/1
650 TABLET ORAL EVERY 4 HOURS PRN
Status: DISCONTINUED | OUTPATIENT
Start: 2022-03-11 | End: 2022-03-13 | Stop reason: HOSPADM

## 2022-03-11 RX ORDER — HYDROCORTISONE 2.5 %
CREAM (GRAM) TOPICAL 3 TIMES DAILY PRN
Status: DISCONTINUED | OUTPATIENT
Start: 2022-03-11 | End: 2022-03-13 | Stop reason: HOSPADM

## 2022-03-11 RX ORDER — TRANEXAMIC ACID 10 MG/ML
1 INJECTION, SOLUTION INTRAVENOUS EVERY 30 MIN PRN
Status: DISCONTINUED | OUTPATIENT
Start: 2022-03-11 | End: 2022-03-13 | Stop reason: HOSPADM

## 2022-03-11 RX ORDER — FENTANYL CITRATE 50 UG/ML
INJECTION, SOLUTION INTRAMUSCULAR; INTRAVENOUS PRN
Status: DISCONTINUED | OUTPATIENT
Start: 2022-03-11 | End: 2022-03-11

## 2022-03-11 RX ORDER — OXYCODONE HYDROCHLORIDE 5 MG/1
5 TABLET ORAL EVERY 4 HOURS PRN
Status: DISCONTINUED | OUTPATIENT
Start: 2022-03-11 | End: 2022-03-13 | Stop reason: HOSPADM

## 2022-03-11 RX ORDER — OXYTOCIN/0.9 % SODIUM CHLORIDE 30/500 ML
1-24 PLASTIC BAG, INJECTION (ML) INTRAVENOUS CONTINUOUS
Status: DISCONTINUED | OUTPATIENT
Start: 2022-03-11 | End: 2022-03-11

## 2022-03-11 RX ORDER — DOCUSATE SODIUM 100 MG/1
100 CAPSULE, LIQUID FILLED ORAL DAILY
Status: DISCONTINUED | OUTPATIENT
Start: 2022-03-11 | End: 2022-03-13 | Stop reason: HOSPADM

## 2022-03-11 RX ORDER — SODIUM CHLORIDE, SODIUM LACTATE, POTASSIUM CHLORIDE, CALCIUM CHLORIDE 600; 310; 30; 20 MG/100ML; MG/100ML; MG/100ML; MG/100ML
INJECTION, SOLUTION INTRAVENOUS CONTINUOUS PRN
Status: DISCONTINUED | OUTPATIENT
Start: 2022-03-11 | End: 2022-03-11

## 2022-03-11 RX ORDER — MISOPROSTOL 200 UG/1
400 TABLET ORAL
Status: DISCONTINUED | OUTPATIENT
Start: 2022-03-11 | End: 2022-03-13 | Stop reason: HOSPADM

## 2022-03-11 RX ORDER — NALOXONE HYDROCHLORIDE 0.4 MG/ML
0.2 INJECTION, SOLUTION INTRAMUSCULAR; INTRAVENOUS; SUBCUTANEOUS
Status: DISCONTINUED | OUTPATIENT
Start: 2022-03-11 | End: 2022-03-11

## 2022-03-11 RX ORDER — MODIFIED LANOLIN
OINTMENT (GRAM) TOPICAL
Status: DISCONTINUED | OUTPATIENT
Start: 2022-03-11 | End: 2022-03-13 | Stop reason: HOSPADM

## 2022-03-11 RX ORDER — NALOXONE HYDROCHLORIDE 0.4 MG/ML
0.2 INJECTION, SOLUTION INTRAMUSCULAR; INTRAVENOUS; SUBCUTANEOUS
Status: DISCONTINUED | OUTPATIENT
Start: 2022-03-11 | End: 2022-03-13 | Stop reason: HOSPADM

## 2022-03-11 RX ORDER — EPHEDRINE SULFATE 50 MG/ML
5 INJECTION, SOLUTION INTRAMUSCULAR; INTRAVENOUS; SUBCUTANEOUS
Status: DISCONTINUED | OUTPATIENT
Start: 2022-03-11 | End: 2022-03-11

## 2022-03-11 RX ORDER — LIDOCAINE HYDROCHLORIDE 10 MG/ML
INJECTION, SOLUTION EPIDURAL; INFILTRATION; INTRACAUDAL; PERINEURAL
Status: DISCONTINUED
Start: 2022-03-11 | End: 2022-03-11 | Stop reason: HOSPADM

## 2022-03-11 RX ORDER — BISACODYL 10 MG
10 SUPPOSITORY, RECTAL RECTAL DAILY PRN
Status: DISCONTINUED | OUTPATIENT
Start: 2022-03-11 | End: 2022-03-13 | Stop reason: HOSPADM

## 2022-03-11 RX ORDER — HYDROMORPHONE HYDROCHLORIDE 1 MG/ML
0.3 INJECTION, SOLUTION INTRAMUSCULAR; INTRAVENOUS; SUBCUTANEOUS
Status: DISCONTINUED | OUTPATIENT
Start: 2022-03-11 | End: 2022-03-13 | Stop reason: HOSPADM

## 2022-03-11 RX ORDER — NALOXONE HYDROCHLORIDE 0.4 MG/ML
0.4 INJECTION, SOLUTION INTRAMUSCULAR; INTRAVENOUS; SUBCUTANEOUS
Status: DISCONTINUED | OUTPATIENT
Start: 2022-03-11 | End: 2022-03-11

## 2022-03-11 RX ORDER — IBUPROFEN 800 MG/1
800 TABLET, FILM COATED ORAL EVERY 6 HOURS PRN
Status: DISCONTINUED | OUTPATIENT
Start: 2022-03-11 | End: 2022-03-13 | Stop reason: HOSPADM

## 2022-03-11 RX ORDER — NALBUPHINE HYDROCHLORIDE 10 MG/ML
2.5-5 INJECTION, SOLUTION INTRAMUSCULAR; INTRAVENOUS; SUBCUTANEOUS EVERY 6 HOURS PRN
Status: DISCONTINUED | OUTPATIENT
Start: 2022-03-11 | End: 2022-03-11

## 2022-03-11 RX ORDER — OXYTOCIN 10 [USP'U]/ML
10 INJECTION, SOLUTION INTRAMUSCULAR; INTRAVENOUS
Status: DISCONTINUED | OUTPATIENT
Start: 2022-03-11 | End: 2022-03-13 | Stop reason: HOSPADM

## 2022-03-11 RX ADMIN — SODIUM CHLORIDE, POTASSIUM CHLORIDE, SODIUM LACTATE AND CALCIUM CHLORIDE 500 ML: 600; 310; 30; 20 INJECTION, SOLUTION INTRAVENOUS at 09:24

## 2022-03-11 RX ADMIN — MISOPROSTOL 25 MCG: 100 TABLET ORAL at 07:42

## 2022-03-11 RX ADMIN — LIDOCAINE HYDROCHLORIDE AND EPINEPHRINE 3 ML: 15; 5 INJECTION, SOLUTION EPIDURAL at 09:51

## 2022-03-11 RX ADMIN — Medication 2 MILLI-UNITS/MIN: at 09:20

## 2022-03-11 RX ADMIN — BUPIVACAINE HYDROCHLORIDE 5 ML/HR: 7.5 INJECTION, SOLUTION EPIDURAL; RETROBULBAR at 09:55

## 2022-03-11 RX ADMIN — SODIUM CHLORIDE, POTASSIUM CHLORIDE, SODIUM LACTATE AND CALCIUM CHLORIDE: 600; 310; 30; 20 INJECTION, SOLUTION INTRAVENOUS at 09:20

## 2022-03-11 RX ADMIN — LIDOCAINE HYDROCHLORIDE 8 ML: 10 INJECTION, SOLUTION INFILTRATION; PERINEURAL at 09:42

## 2022-03-11 RX ADMIN — IBUPROFEN 800 MG: 800 TABLET ORAL at 18:49

## 2022-03-11 RX ADMIN — FENTANYL CITRATE 100 MCG: 50 INJECTION, SOLUTION INTRAMUSCULAR; INTRAVENOUS at 09:51

## 2022-03-11 RX ADMIN — BUPIVACAINE HYDROCHLORIDE: 7.5 INJECTION, SOLUTION EPIDURAL; RETROBULBAR at 09:59

## 2022-03-11 RX ADMIN — SODIUM CHLORIDE, POTASSIUM CHLORIDE, SODIUM LACTATE AND CALCIUM CHLORIDE: 600; 310; 30; 20 INJECTION, SOLUTION INTRAVENOUS at 13:09

## 2022-03-11 RX ADMIN — LIDOCAINE HYDROCHLORIDE AND EPINEPHRINE 2 ML: 15; 5 INJECTION, SOLUTION EPIDURAL at 09:52

## 2022-03-11 RX ADMIN — MISOPROSTOL 25 MCG: 100 TABLET ORAL at 01:27

## 2022-03-11 RX ADMIN — MISOPROSTOL 25 MCG: 100 TABLET ORAL at 03:29

## 2022-03-11 RX ADMIN — MISOPROSTOL 25 MCG: 100 TABLET ORAL at 05:38

## 2022-03-11 NOTE — PLAN OF CARE
Emily Fernández CRNA called and in room around 0940. Patient and procedure correctly identified/verified with CRNA. Time out completed. Consent signed. 500cc fluid bolus given. Patient in position for epidural placement. Epidural placed without complications. Test dose/bolus given by CRNA and patient tolerated well. Patient rated her pain as 9/10 prior to epidural and after medication administration, pain level 0/10. Ephedrine was Not given at this time . Dermatomes were assessed.

## 2022-03-11 NOTE — L&D DELIVERY NOTE
"Delivery Summary    Vianey Cortez MRN# [id number]   Age: 36 year old YOB: 1985     ASSESSMENT & PLAN: 37 y/o  admitted at 41 weeks for cervical ripening and IOL due to postdates pregnancy; she received 24 hours of Q2 hour Misoprostol dosing, and at that time, her cervix was now favorable;  AROM performed with clear fluid and pitocin was started to effect contractions  Epidural given for labor analgesia  She had normal stage 1 progress, began to push immediately due to presence of variable decels with contractions. She pushed extremely effectively for less than 1 hour, brought the baby to +2 station,however FHR did not return to baseline, so Kiwi vacuum applied to deliver baby in 1 final contraction,  Infant on maternal abdomen for delayed clamping then to warmer for assessment by Peds NP.  2nd degree perineal laceration repaired with 3-0 vicryl  Placenta spontaneous and intact.  \"Guillermo\"  QBL 150cc  Lupe Goldstein MD  [facility]         Robson Cortez [[id number]]    Labor Events     labor?: No   steroids: None  Labor Type: Induction/Cervical ripening, AROM  Predominate monitoring during 1st stage: continuous electronic fetal monitoring     Rupture date/time: 3/11/22 0826   Rupture type: Artificial Rupture of Membranes  Fluid color: Clear  Fluid odor: Normal     Induction: Misoprostol, Oxytocin, AROM  Induction date/time:     Cervical ripening date/time: 3/10/22 0840   Indications for induction: Post-term Gestation     1:1 continuous labor support provided by?: RN       Delivery/Placenta Date and Time    Delivery Date: 3/11/22 Delivery Time:  4:22 PM   Placenta Date/Time: 3/11/2022  4:28 PM  Oxytocin given at the time of delivery: after delivery of baby  Delivering clinician: Lupe Goldstein MD   Other personnel present at delivery:  Provider Role   Flakita Kan RN Johnson, Kristin J, RN          Vaginal Counts     Initial count performed " by 2 team members:  Two Team Members   Angela HOWE       Needles Suture Needles Sponges (RETIRED) Instruments   Initial counts 2  5    Added to count  1     Relief counts       Final counts 2 1 5          Placed during labor Accounted for at the end of labor   FSE NA NA   IUPC Yes Yes   Cervadil NA NA              Final count performed by 2 team members:  Two Team Members   Dr Angelita Anna      Final count correct?: Yes     Apgars    Living status: Living   1 Minute 5 Minute 10 Minute 15 Minute 20 Minute   Skin color: 0  1       Heart rate: 2  2       Reflex irritability: 2  2       Muscle tone: 1  2       Respiratory effort: 2  2       Total: 7  9       Apgars assigned by: ALEXANDRE CHUA     Cord    Vessels: 3 Vessels    Cord Complications: None               Cord Blood Disposition: Lab    Gases Sent?: No    Delayed cord clamping?: No    Stem cell collection?: No        Resuscitation    Methods: None   Care at Delivery: Vacuum x 1 pull     Skin to Skin and Feeding Plan    Skin to skin initiation date/time: 1/3/1841    Skin to skin with: Mother  Skin to skin end date/time:        Labor Events and Shoulder Dystocia    Fetal Tracing Prior to Delivery: Category 2  Fetal Tracing Comments: stage 2 with repetitive variable decelerations     Delivery (Maternal) (Provider to Complete) ([id number])    Episiotomy: None  Perineal lacerations: 2nd Repaired?: Yes   Repair suture: 3-0 Vicryl  Genital tract inspection done: Pos     Blood Loss  Mother: DanaVianey mendez N #[id number]   Start of Mother's Information    Delivery Blood Loss  22 0422 - 22 1647    None           End of Mother's Information  Mother: DanaVianey mendez N #[id number]          Delivery - Provider to Complete ([id number])    Delivering clinician: Lupe Goldstein MD  Attempted Delivery Types (Choose all that apply): Spontaneous Vaginal Delivery  Delivery Type (Choose the 1 that will go to the Birth History):  Vaginal, Vacuum (Extractor)    Verbal Informed Consent Obtained For Vacuum: Yes Alternate Labor Strategies Discussed (vacuum): Yes    Emergency Resources Available (vacuum): Charge Nurse/Team, Resuscitation Team   Type (vacuum): Kiwi Accrued Pulling Time (vacuum): 1 min      # of Pop-Offs (vacuum): 0 # of Pulls/Contractions (vacuum): 1   Position (vacuum): OA Fetal Station (vacuum): +2      Indication for Operative Vaginal Delivery (vacuum): Fetal Status   Operative Vaginal Delivery Brief Note Vacuum: The pt was pushing effectively with repetitive deep variable decels present with every push; they would resolve with head stimulation, however in the final part of stage 2, the FHR did not respond, so a Kiwi vacuum was obtained, verbal consent obtained; the fetal head was delivered with 1 contraction.              Other personnel:  Provider Role   Flakita Kan RN Johnson, Kristin J, RN                 Placenta    Date/Time: 3/11/2022  4:28 PM  Removal: Spontaneous  Disposition: Hospital disposal           Anesthesia    Method: Epidural  Cervical dilation at placement: 0-3                Presentation and Position    Presentation: Vertex    Position: Right Occiput Anterior                 Lupe Goldstein MD

## 2022-03-11 NOTE — PROGRESS NOTES
"Assumed care for this pt this AM, admitted yesterday for cervical ripening and eventual IOL.  She received 24 hours of Q2 hr Misoprostol doses, is marni every 2-3 mins, still mild in intensity; denies LOF  GBS negative.  Pregnancy remarkable for Clomid induced conception, maternal BMI>40  Growth ultrasound at 36wks, EFW 3092gm (57%)    /64   Pulse 66   Temp 97.6  F (36.4  C) (Oral)   Resp 16   Ht 1.575 m (5' 2\")   Wt 112.9 kg (249 lb)   LMP 2021   Breastfeeding No   BMI 45.54 kg/m      VE: 2-3cm/90/-1  AROM: small amount of clear fluid.    EFM: tier 1    A: 41 week IUP    P:start pitocin since cervix now favorable and AROM  Anticipate   Pt desires epidural  Lupe Goldstein MD  [facility]    "

## 2022-03-11 NOTE — PLAN OF CARE
Goal Outcome Evaluation:    Plan of Care Reviewed With: patient     Overall Patient Progress: improving    Patient was able to rest comfortably overnight follow Morphine injection and Atarax. Recieved Miso every 2 hours with the 12 th dose due at 0738 . Contractions have been irregular. Infant has been category one. Using the Jeanne to monitor. Patient starting to notice cramping pain at 5:00 am.

## 2022-03-11 NOTE — ANESTHESIA PROCEDURE NOTES
Epidural catheter Procedure Note    Pre-Procedure   Staff -        CRNA: Emily Fernández APRN CRNA       Performed By: CRNA       Location: OB       Procedure Start/Stop Times: 3/11/2022 9:36 AM and 3/11/2022 10:07 AM       Pre-Anesthestic Checklist: patient identified, IV checked, risks and benefits discussed, informed consent, monitors and equipment checked, pre-op evaluation and at physician/surgeon's request  Timeout:       Correct Patient: Yes        Correct Procedure: Yes        Correct Site: Yes        Correct Position: Yes   Procedure Documentation  Procedure: epidural catheter       Diagnosis: pain       Patient Position: sitting       Patient Prep/Sterile Barriers: sterile gloves, mask, patient draped       Skin prep: DuraPrep       Local skin infiltrated with 8 mL of 1% lidocaine.        Insertion Site: L3-4. (midline approach).       Technique: LORT saline        TRACE at 8 cm.       Needle Type: [employer] needle       Needle Gauge: 17.        Needle Length (Inches): 3.5        Catheter: 19 G.         Catheter threaded easily.         Threaded 13 cm at skin.         # of attempts: 1 and  # of redirects:  1    Assessment/Narrative         Paresthesias: No.       Test dose of 3 mL lidocaine 1.5% w/ 1:200,000 epinephrine at 09:51 CST.         Test dose negative, 3 minutes after injection, for signs of intravascular, subdural, or intrathecal injection.       Insertion/Infusion Method: LORT saline       Aspiration negative for Heme or CSF via Epidural Catheter.     Comments:  VAS pain score prior to epidural:7    VAS pain score after epidural:1    Pt. Tolerated well, FHR stable.

## 2022-03-11 NOTE — PLAN OF CARE
Vitals stable; having some cramping. Up ad ren. Tolerating regular diet. AROM by Dr. Goldstein; small amount of clear fluid. Patient up to shower. Plan to start pitocin and place epidural when out of the shower. , Sung at the bedside; supportive.

## 2022-03-11 NOTE — PROGRESS NOTES
Jeanne not picking up contractions well, so changed to external toco, which similarly did not register contractions effectively  IUPC placed without difficulty and excellent monitoring of contractions resulted.    Lupe Goldstein MD  [facility]

## 2022-03-11 NOTE — ANESTHESIA PREPROCEDURE EVALUATION
Anesthesia Pre-Procedure Evaluation    Patient: Vianey Cortez   MRN: [id number] : 1985        Procedure : * No procedures listed *          Past Medical History:   Diagnosis Date     Infertility management 2021     Intracranial hemorrhage (H) 2015     Seizure (H)     x1 after her brain surgery      Past Surgical History:   Procedure Laterality Date     BRAIN SURGERY      TBI after falling down stairs      No Known Allergies   Social History     Tobacco Use     Smoking status: Never Smoker     Smokeless tobacco: Never Used   Substance Use Topics     Alcohol use: Not Currently     Comment: occas-quit with pregnancy      Wt Readings from Last 1 Encounters:   03/10/22 112.9 kg (249 lb)        Anesthesia Evaluation   Pt has had prior anesthetic. Type: General.    No history of anesthetic complications       ROS/MED HX  ENT/Pulmonary:  - neg pulmonary ROS     Neurologic: Comment: H/o intracranial hem. and seizure x1 - neg neurologic ROS     Cardiovascular:  - neg cardiovascular ROS     METS/Exercise Tolerance:     Hematologic:  - neg hematologic  ROS     Musculoskeletal:       GI/Hepatic:     (+) GERD,     Renal/Genitourinary:       Endo: Comment: Morbid obesity    (+) Obesity,     Psychiatric/Substance Use:  - neg psychiatric ROS     Infectious Disease:       Malignancy:       Other:     (-) previous  and TOLAC candidate       Physical Exam    Airway        Mallampati: II   TM distance: > 3 FB   Neck ROM: full   Mouth opening: > 3 cm    Respiratory Devices and Support         Dental  no notable dental history         Cardiovascular   cardiovascular exam normal          Pulmonary   pulmonary exam normal                OUTSIDE LABS:  CBC:   Lab Results   Component Value Date    WBC 11.4 (H) 03/10/2022    WBC 11.2 (H) 2021    HGB 12.1 03/10/2022    HGB 12.2 2021    HCT 36.2 03/10/2022    HCT 37.2 2021     03/10/2022     2021     BMP:   Lab Results    Component Value Date     12/04/2015     09/11/2015    POTASSIUM 3.8 12/04/2015    POTASSIUM 3.9 09/11/2015    CHLORIDE 106 12/04/2015    CHLORIDE 108 09/11/2015    CO2 24 12/04/2015    CO2 27 09/11/2015    BUN 14 12/04/2015    BUN 7 09/11/2015    CR 0.62 12/04/2015    CR 0.66 09/11/2015    GLC 87 12/04/2015    GLC 81 09/11/2015     COAGS:   Lab Results   Component Value Date    INR 1.1 07/05/2015    INR 1.1 07/05/2015     POC: No results found for: BGM, HCG, HCGS  HEPATIC: No results found for: ALBUMIN, PROTTOTAL, ALT, AST, GGT, ALKPHOS, BILITOTAL, BILIDIRECT, VIDHYA  OTHER:   Lab Results   Component Value Date    VAL 8.4 (L) 12/04/2015       Anesthesia Plan    ASA Status:  3      Anesthesia Type: Epidural.              Consents    Anesthesia Plan(s) and associated risks, benefits, and realistic alternatives discussed. Questions answered and patient/representative(s) expressed understanding.    - Discussed:     - Discussed with:  Patient         Postoperative Care            Comments:           neg OB ROS.       MOON Low CRNA

## 2022-03-12 PROCEDURE — 250N000013 HC RX MED GY IP 250 OP 250 PS 637: Performed by: OBSTETRICS & GYNECOLOGY

## 2022-03-12 PROCEDURE — 120N000001 HC R&B MED SURG/OB

## 2022-03-12 RX ADMIN — ACETAMINOPHEN 650 MG: 325 TABLET, FILM COATED ORAL at 18:51

## 2022-03-12 RX ADMIN — ACETAMINOPHEN 650 MG: 325 TABLET, FILM COATED ORAL at 13:07

## 2022-03-12 RX ADMIN — IBUPROFEN 800 MG: 800 TABLET ORAL at 20:46

## 2022-03-12 RX ADMIN — ACETAMINOPHEN 650 MG: 325 TABLET, FILM COATED ORAL at 23:47

## 2022-03-12 RX ADMIN — ACETAMINOPHEN 650 MG: 325 TABLET, FILM COATED ORAL at 05:19

## 2022-03-12 RX ADMIN — IBUPROFEN 800 MG: 800 TABLET ORAL at 00:49

## 2022-03-12 RX ADMIN — IBUPROFEN 800 MG: 800 TABLET ORAL at 07:59

## 2022-03-12 RX ADMIN — IBUPROFEN 800 MG: 800 TABLET ORAL at 14:42

## 2022-03-12 RX ADMIN — ACETAMINOPHEN 650 MG: 325 TABLET, FILM COATED ORAL at 00:49

## 2022-03-12 RX ADMIN — DOCUSATE SODIUM 100 MG: 100 CAPSULE, LIQUID FILLED ORAL at 08:43

## 2022-03-12 NOTE — PLAN OF CARE
Patient progressing well.  Ambulating, Eating and voiding well. Independent with mother/baby cares. Bonding well with infant.  Breast feeding is going well, baby is latching well and feeding for good lengths of time. Patient rates perineum and uterine cramping  Pain 8/10.  Taking Ibuprofen and tylenol when due with good relief.  Made plan with patient to offer pain medication when due. Patient encouraged to report increased bleeding or clots.          Goal Outcome Evaluation:    Plan of Care Reviewed With: patient, spouse     Overall Patient Progress: improving

## 2022-03-12 NOTE — PLAN OF CARE
Ambulating and voiding independently. Vitals and assessments WDL. Fundus is firm with minimal bleeding. Pain is being managed with PRN Tylenol and Ibuprofen. [place] and attentive towards infant. Independent with breastfeeding.  is at the bedside and attentive towards patient. Would like to discharge tomorrow.

## 2022-03-12 NOTE — PROGRESS NOTES
[facility] Obstetrics Post-Partum Progress Note          Assessment and Plan:    Assessment:   Post-partum day #1  Induced vaginal delivery secondary to post-dates  Vacuum extraction  L&D complications: Intrauterine pregnancy at 41 weeks gestation  Second degree laceration      Doing well.  Clean wound without signs of infection.  Normal healing wound.  No excessive bleeding  Pain well-controlled.      Plan:   Ambulation encouraged  Breast feeding strategies discussed  Anticipate discharge tomorrow           Interval History:   Doing well.  Pain is well-controlled.  No fevers.  No history of foul-smelling vaginal discharge.  Good appetite.  Denies chest pain, shortness of breath, nausea or vomiting.  Vaginal bleeding is similar to a heavy menstrual flow.  Ambulatory.  Breastfeeding well.          Significant Problems:    Principal Problem:    Encounter for prenatal care in third trimester of first pregnancy  Active Problems:    BMI 40.0-44.9, adult (H)    Encounter for preprocedure screening laboratory testing for COVID-19    Vacuum-assisted vaginal delivery            Review of Systems:    The patient denies any chest pain, shortness of breath, excessive pain, fever, chills, purulent drainage from the wound, nausea or vomiting.          Medications:   All medications related to the patient's surgery have been reviewed          Physical Exam:   All vitals stable  Temp: 97.4  F (36.3  C) Temp src: Oral BP: 110/55 Pulse: 95   Resp: 16 SpO2: 97 % O2 Device: None (Room air)    Uterine fundus is firm, non-tender and at the level of the umbilicus          Data:     All laboratory data related to this surgery reviewed  Hemoglobin   Date Value Ref Range Status   03/10/2022 12.1 11.7 - 15.7 g/dL Final   11/12/2021 12.2 11.7 - 15.7 g/dL Final   08/06/2021 13.4 11.7 - 15.7 g/dL Final   12/04/2015 10.3 (L) 11.7 - 15.7 g/dL Final   09/11/2015 10.0 (L) 11.7 - 15.7 g/dL Final   02/27/2007 14.0 11.7 - 15.7 g/dL  Final         Burton Schrader MD

## 2022-03-12 NOTE — PLAN OF CARE
Up to BR for pericare. Pt was able to void. Mother and baby transferred to postpartum unit at 1850 via ambulatory and bassinet after completion of immediate recovery period. Patient oriented to room and instructed to call for assistance when up to the bathroom the next time. Report given to Beverly DAVILA who assumes patient care. Mother and baby bonding well and in stable condition upon transfer.

## 2022-03-12 NOTE — PLAN OF CARE
S:Delivery  B:Induced  Labor,41w0d    No results found for: GBS with antibiotic treatment not indicated 4 hours prior to delivery.  A: Patient delivered VAVD  lac 2nd degree at 1622 with Dr. MICH Goldstein in attendance and baby placed on mother's abdomen for delayed cord clamping. Baby dried and stimulated. Baby placed  skin to skin @ 1622.. Apgars 7/9.Delivery .  IV infusion of Oxytocin  infused. Placenta removal spontaneous. MD does not want placenta sent to pathology.  See Flowsheet for VS and PP checks. Delivery QBL (mL): 150 mL.  Labor care plan goals met, transition now to postpartum care. Postpartum QCP680  R: Expect routine postpartum care. Anticipate first feeding within the hour or whenever infant displays feeding cues. Continue skin to skin. Prior discussion with mother indicates that feeding plan is Breast feeding . Educated mother on importance of exclusive breastfeeding, expected feeding readiness cues and encouraged her to observe for these cues while rooming in. Informed her that breastfeeding assistance would be provided.

## 2022-03-13 VITALS
WEIGHT: 241.4 LBS | TEMPERATURE: 97.5 F | RESPIRATION RATE: 16 BRPM | HEIGHT: 62 IN | BODY MASS INDEX: 44.42 KG/M2 | OXYGEN SATURATION: 95 % | DIASTOLIC BLOOD PRESSURE: 58 MMHG | HEART RATE: 76 BPM | SYSTOLIC BLOOD PRESSURE: 117 MMHG

## 2022-03-13 PROCEDURE — 250N000013 HC RX MED GY IP 250 OP 250 PS 637: Performed by: OBSTETRICS & GYNECOLOGY

## 2022-03-13 RX ADMIN — IBUPROFEN 800 MG: 800 TABLET ORAL at 03:44

## 2022-03-13 RX ADMIN — DOCUSATE SODIUM 100 MG: 100 CAPSULE, LIQUID FILLED ORAL at 08:19

## 2022-03-13 RX ADMIN — IBUPROFEN 800 MG: 800 TABLET ORAL at 11:41

## 2022-03-13 RX ADMIN — ACETAMINOPHEN 650 MG: 325 TABLET, FILM COATED ORAL at 05:00

## 2022-03-13 RX ADMIN — ACETAMINOPHEN 650 MG: 325 TABLET, FILM COATED ORAL at 11:41

## 2022-03-13 NOTE — PROGRESS NOTES
Patient discharge instructions were reviewed with patient and spouse. Denied any questions or concerns. Patient will discharge today with infant once infant has been discharged.

## 2022-03-13 NOTE — DISCHARGE SUMMARY
"[facility] Vaginal Delivery Discharge Summary    Admit date: 3/10/2022  Discharge date: 3/13/2022     Admit Dx:   - 36 year old y/o  at 40w6d  - post-dates induction of labor   - resolved low-lying placenta  - BMI 45  - nuchal fluid collection on 10 wk US     Discharge Dx:  - Same as above  - fetal bradycardia at +2 station     Procedures:  - vacuum-assisted vaginal delivery   - epidural analgesia    Admit HPI:  35 y/o  admitted at 41 weeks for cervical ripening and IOL due to postdates pregnancy; she received 24 hours of Q2 hour Misoprostol dosing, and at that time, her cervix was now favorable;  AROM performed with clear fluid and pitocin was started to effect contractions  Epidural given for labor analgesia  She had normal stage 1 progress, began to push immediately due to presence of variable decels with contractions. She pushed extremely effectively for less than 1 hour, brought the baby to +2 station,however FHR did not return to baseline, so Kiwi vacuum applied to deliver baby in 1 final contraction,  Infant on maternal abdomen for delayed clamping then to warmer for assessment by Peds NP.  2nd degree perineal laceration repaired with 3-0 vicryl  Placenta spontaneous and intact.  \"Guillermo\"  QBL 150cc  Please see her admit H&P for full details of her PMH, PSH, Meds, Allergies and exam on admit.    Her postpartum course was uncomplicated. On PPD#2, she was meeting all of her postpartum goals and deemed stable for discharge. She was voiding without difficulty, tolerating a regular diet without nausea and vomiting, her pain was well controlled on oral pain medicines and her lochia was appropriate. Her Rh status was POS and Rhogam was not indicated.    Physical exam on the day of discharge:  Vitals:    22 0900 22 1700 22 2353 22 0800   BP: 110/55 102/68 100/60 117/58   BP Location: Right arm Right arm  Left arm   Patient Position: Semi-Jolley's Sitting  " Semi-Jolley's   Cuff Size: Adult Regular Adult Regular  Adult Regular   Pulse: 95 81 68 76   Resp: 16 16 16 16   Temp: 97.4  F (36.3  C) 97.8  F (36.6  C) 97.3  F (36.3  C) 97.5  F (36.4  C)   TempSrc: Oral Oral Oral Oral   SpO2:  100% 99% 95%   Weight:       Height:           General: sitting up, alert and cooperative  Abd: soft, non-distended, non-tender. Fundus firm, nontender, well below umbilicus.   Extremities: calves nontender, trace edema of lower extremities bilaterally    Lab Results   Component Value Date    HGB 12.1 03/10/2022    HGB 12.2 11/12/2021    HGB 10.3 12/04/2015    HGB 10.0 09/11/2015     Blood type: No results found for: RH    Discharge/Disposition:  Vianey Cortez was discharged to home in stable condition with the following instructions/medications:  1) Call for temperature > 100.4, foul smelling vaginal discharge, bleeding > 1 pad per hour x 2 hrs, pain not controlled by oral pain meds, severe constipation or severe nausea or vomiting.  2) She was instructed to follow-up with her primary OB in 6 weeks for a routine postpartum visit.  3) Discharge instructions: reg diet, pelvic rest for 6 weeks   4) She was discharged home with the following medications:      Review of your medicines      CONTINUE these medicines which have NOT CHANGED      Dose / Directions   prenatal multivitamin w/iron 27-0.8 MG tablet      Dose: 1 tablet  Take 1 tablet by mouth daily  Refills: 0     Tylenol 325 MG tablet  Generic drug: acetaminophen      AS NEEDED FOR PAIN  Refills: 0        STOP taking    doxylamine 25 MG Tabs tablet  Commonly known as: UNISOM        pyridOXINE 100 MG Tabs  Commonly known as: VITAMIN B6               Simona Lozano MD  [facility]

## 2022-03-13 NOTE — CARE PLAN
Data: Vital signs within normal limits. Postpartum checks within normal limits - see flow record. Patient eating and drinking normally. Patient able to empty bladder independently and is up ambulating. No apparent signs of infection. Patient performing self cares and is able to care for infant.  Action: Patient medicated during the shift for cramping. See MAR. Patient reassessed within 1 hour after each medication and pain was improved - patient stated she was comfortable. Patient education done about breastfeeding, pain management, rest and  care. See flow record.  Response: Positive attachment behaviors observed with infant. Support persons present.   Plan: Anticipate discharge on 3/13/21.

## 2022-03-14 ENCOUNTER — TELEPHONE (OUTPATIENT)
Dept: OBGYN | Facility: CLINIC | Age: 37
End: 2022-03-14
Payer: COMMERCIAL

## 2022-03-14 NOTE — TELEPHONE ENCOUNTER
Paperwork completed and given to Dr. Schrader to sign and approve.    Yue Kohler   Clinic [facility] [place]   [facility]  [phone]

## 2022-03-15 NOTE — TELEPHONE ENCOUNTER
Paperwork completed and signed by Dr. Schrader  faxed to [phone]      [facility]   Clinic Station    [facility]  [phone]

## 2022-04-07 ENCOUNTER — NURSE TRIAGE (OUTPATIENT)
Dept: NURSING | Facility: CLINIC | Age: 37
End: 2022-04-07
Payer: COMMERCIAL

## 2022-04-07 NOTE — TELEPHONE ENCOUNTER
"Pt would like lactation consult.  First baby.  One month old.  Reports fitful process.  \"Lost weight, then gained it back.\"    Mother reports pumping seems ineffective.  \"Can't get anything out.\"  Has even tried pumping during feedings (other breast).  Advised not to do this.  Needs to relax during feedings.  Pump afterward or while baby naps.  Drink generous water throughout the day.    Wet diaper output every two hours.  Baby has \"one very large stool per day.\"  However mom feels baby is not satisfied after feedings.  Remains fussy.  No spit-up.    Discussed baby's current age often comes with a growth spurt, with seeming hunger every hour of the day.  Mom becomes tearful.  Feels unable to satisfy baby.    Advised increasing water intake.  Obtain body pouch carrier to keep baby in upright position as feasible and increase closeness to facilitate milk supply.  Refrain from pumping while breastfeeding.  Provided contact info for lactation consult and also warm-transferred to [facility] office -> [phone].  Lactation consultant will call back to pt today to set a time for visit.    [facility] Nurse Advisor     Reason for Disposition    Breastfeeding questions about mother (breast symptoms or feeling sick)    Breastfeeding concerns about the baby    Seems hungry after feedings (cries after nursing or wants to be fed > 12 times/day)    Additional Information    Negative: SEVERE breast pain with fever > 103 F (39.4 C)    Negative: Mother sounds very sick or weak to the triager    Negative: Breast looks infected (spreading redness, feels hot to touch) with large red area (> 2 in. or 5 cm)    Negative: Breast pain or lump and mother has fever > 100.4 F (38.0 C)    Negative: Breast pain or lump and mother has chills or feeling overall ill    Negative: SEVERE pain not improved 2 hours after pain medicine and care advice    Negative: Breast looks infected (area of redness) without fever    Negative: " Blisters on nipple or areola (Note: stop breastfeeding until exam)    Negative: Unresponsive and can't be awakened    Negative: Sounds like a life-threatening emergency to the triager    Negative: Breastfeeding questions about maternal breast symptoms or illness and baby feeding adequately    Negative: Breastfeeding questions about maternal medicines, other drugs or diet and baby feeding adequately    Negative: Choking on breastmilk and not from overactive letdown reflex    Negative: Weaning from the breast, questions about    Negative: Formula fed    Negative: Spitting up is the main concern    Negative: Jaundice is the main concern    Negative: Age < 12 weeks with fever 100.4 F (38.0 C) or higher rectally    Negative: [place] < 4 weeks starts to look or act abnormal in any way    Negative: Child sounds very sick to the triager (e.g., too weak to suck, doesn't move or make eye contact, true lethargy)    Negative: Signs of dehydration (such as < 3 wet diapers/day, no stool for 24 hours, brick-dust urine from urates 3 or more times, sunken soft spot, very dry mouth)    Negative: Refuses to breastfeed for > 8 hours    Negative: Skin and whites of eyes looks deep yellow or orange    Protocols used: POSTPARTUM - BREASTFEEDING PROTOCOL [id number], BREASTFEEDING - MOTHER'S BREAST SYMPTOMS OR [id number], BREASTFEEDING - BABY [id number]    ___________________      COVID 19 Nurse Triage Plan/Patient Instructions    Please be aware that novel coronavirus (COVID-19) may be circulating in the community. If you develop symptoms such as fever, cough, or SOB or if you have concerns about the presence of another infection including coronavirus (COVID-19), please contact your health care provider or visit [email or web address].     Disposition/Instructions    Additional COVID19 information to add for patients.   How can I protect others?  If you have symptoms (fever, cough, body aches or trouble breathing): Stay  "home and away from others (self-isolate) until:    At least 10 days have passed since your symptoms started, And     You ve had no fever--and no medicine that reduces fever--for 1 full day (24 hours), And      Your other symptoms have resolved (gotten better).     If you don t have symptoms, but a test showed that you have COVID-19 (you tested positive):    Stay home and away from others (self-isolate). Follow the tips under \"How do I self-isolate?\" below for 10 days (20 days if you have a weak immune system).    You don't need to be retested for COVID-19 before going back to school or work. As long as you're fever-free and feeling better, you can go back to school, work and other activities after waiting the 10 or 20 days.     How do I self-isolate?    Stay in your own room, even for meals. Use your own bathroom if you can.     Stay away from others in your home. No hugging, kissing or shaking hands. No visitors.    Don t go to work, school or anywhere else.     Clean  high touch  surfaces often (doorknobs, counters, handles, etc.). Use a household cleaning spray or wipes. You ll find a full list on the EPA website:  www.epa.gov/pesticide-registration/list-n-disinfectants-use-against-sars-cov-2.    Cover your mouth and nose with a mask, tissue or washcloth to avoid spreading germs.    Wash your hands and face often. Use soap and water.    Caregivers in these groups are at risk for severe illness due to COVID-19:  o People 65 years and older  o People who live in a nursing home or long-term care facility  o People with chronic disease (lung, heart, cancer, diabetes, kidney, liver, immunologic)  o People who have a weakened immune system, including those who:  - Are in cancer treatment  - Take medicine that weakens the immune system, such as corticosteroids  - Had a bone marrow or organ transplant  - Have an immune deficiency  - Have poorly controlled HIV or AIDS  - Are obese (body mass index of 40 or " higher)  - Smoke regularly    Caregivers should wear gloves while washing dishes, handling laundry and cleaning bedrooms and bathrooms.    Use caution when washing and drying laundry: Don t shake dirty laundry, and use the warmest water setting that you can.    For more tips, go to www.cdc.gov/coronavirus/2019-ncov/downloads/10Things.pdf.    How can I take care of myself?  1. Get lots of rest. Drink extra fluids (unless a doctor has told you not to).     2. Take Tylenol (acetaminophen) for fever or pain. If you have liver or kidney problems, ask your family doctor if it s okay to take Tylenol.     Adults can take either:     650 mg (two 325 mg pills) every 4 to 6 hours, or     1,000 mg (two 500 mg pills) every 8 hours as needed.     Note: Don t take more than 3,000 mg in one day.   Acetaminophen is found in many medicines (both prescribed and over-the-counter medicines). Read all labels to be sure you don t take too much.     For children, check the Tylenol bottle for the right dose. The dose is based on the child s age or weight.    3. If you have other health problems (like cancer, heart failure, an organ transplant or severe kidney disease): Call your specialty clinic if you don t feel better in the next 2 days.    4. Know when to call 911: Emergency warning signs include:    Trouble breathing or shortness of breath    Pain or pressure in the chest that doesn t go away    Feeling confused like you haven t felt before, or not being able to wake up    Bluish-colored lips or face    What are the symptoms of COVID-19?     The most common symptoms are cough, fever and trouble breathing.     Less common symptoms include body aches, chills, diarrhea (loose, watery poops), fatigue (feeling very tired), headache, runny nose, sore throat and loss of smell.    COVID-19 can cause severe coughing (bronchitis) and lung infection (pneumonia).    How does it spread?     The virus may spread when a person coughs or sneezes into  the air. The virus can travel about 6 feet this way, and it can live on surfaces.      Common  (household disinfectants) will kill the virus.    Who is at risk?  Anyone can catch COVID-19 if they re around someone who has the virus.    How can others protect themselves?     Stay away from people who have COVID-19 (or symptoms of COVID-19).    Wash hands often with soap and water. Or, use hand  with at least 60% alcohol.    Avoid touching the eyes, nose or mouth.     Wear a face mask when you go out in public, when sick or when caring for a sick person.    Where can I get more information?     [facility]: About COVID-19: [email or web address]    CDC: What to Do If You re Sick: www.cdc.gov/coronavirus/2019-ncov/about/steps-when-sick.html    CDC: Ending Home Isolation: www.cdc.gov/coronavirus/2019-ncov/hcp/disposition-in-home-patients.html     CDC: Caring for Someone: www.cdc.gov/coronavirus/2019-ncov/if-you-are-sick/care-for-someone.html     [facility]: Interim Guidance for Hospital Discharge to Home: www.health.[facility].[place]./diseases/coronavirus/hcp/hospdischarge.pdf    [facility] clinical trials (COVID-19 research studies): clinicalaffairs.[facility]-clinical-trials     Below are the COVID-19 hotlines at the [place] Department of Health ([facility]). Interpreters are available.   o For health questions: Call 018-894-3322 or 1-253.482.4223 (7 a.m. to 7 p.m.)  o For questions about schools and childcare: Call 707-724-1754 or 1-147.479.1716 (7 a.m. to 7 p.m.)          Thank you for taking steps to prevent the spread of this virus.  o Limit your contact with others.  o Wear a simple mask to cover your cough.  o Wash your hands well and often.    Resources    [facility]: About COVID-19: [email or web address]    CDC: What to Do If You're Sick: www.cdc.gov/coronavirus/2019-ncov/about/steps-when-sick.html    CDC: Ending Home Isolation:  www.cdc.gov/coronavirus/2019-ncov/hcp/disposition-in-home-patients.html     CDC: Caring for Someone: www.cdc.gov/coronavirus/2019-ncov/if-you-are-sick/care-for-someone.html     [facility]: Interim Guidance for Hospital Discharge to Home: www.[facility].[place]/diseases/coronavirus/hcp/hospdischarge.pdf    [facility] clinical trials (COVID-19 research studies): clinicalaffairs.[facility]/n-clinical-trials     Below are the COVID-19 hotlines at the [place] Department of Health ([facility]). Interpreters are available.   o For health questions: Call 167-220-2509 or 1-422.151.7721 (7 a.m. to 7 p.m.)  o For questions about schools and childcare: Call 398-142-0996 or 1-605.790.9121 (7 a.m. to 7 p.m.)

## 2022-04-11 ENCOUNTER — MEDICAL CORRESPONDENCE (OUTPATIENT)
Dept: HEALTH INFORMATION MANAGEMENT | Facility: CLINIC | Age: 37
End: 2022-04-11
Payer: COMMERCIAL

## 2022-04-22 ENCOUNTER — PRENATAL OFFICE VISIT (OUTPATIENT)
Dept: OBGYN | Facility: CLINIC | Age: 37
End: 2022-04-22
Payer: COMMERCIAL

## 2022-04-22 VITALS
WEIGHT: 230 LBS | SYSTOLIC BLOOD PRESSURE: 101 MMHG | RESPIRATION RATE: 18 BRPM | HEIGHT: 62 IN | BODY MASS INDEX: 42.33 KG/M2 | DIASTOLIC BLOOD PRESSURE: 67 MMHG | HEART RATE: 76 BPM | TEMPERATURE: 98.5 F

## 2022-04-22 PROBLEM — Z37.9 VACUUM-ASSISTED VAGINAL DELIVERY: Status: RESOLVED | Noted: 2022-03-11 | Resolved: 2022-04-22

## 2022-04-22 PROBLEM — Z34.00 PRENATAL CARE, FIRST PREGNANCY: Status: RESOLVED | Noted: 2021-08-03 | Resolved: 2022-04-22

## 2022-04-22 PROBLEM — Z34.03 ENCOUNTER FOR PRENATAL CARE IN THIRD TRIMESTER OF FIRST PREGNANCY: Status: RESOLVED | Noted: 2022-03-10 | Resolved: 2022-04-22

## 2022-04-22 PROBLEM — Z11.52 ENCOUNTER FOR PREPROCEDURE SCREENING LABORATORY TESTING FOR COVID-19: Status: RESOLVED | Noted: 2022-03-10 | Resolved: 2022-04-22

## 2022-04-22 PROBLEM — Z01.812 ENCOUNTER FOR PREPROCEDURE SCREENING LABORATORY TESTING FOR COVID-19: Status: RESOLVED | Noted: 2022-03-10 | Resolved: 2022-04-22

## 2022-04-22 PROCEDURE — 99207 PR POST PARTUM EXAM: CPT | Performed by: OBSTETRICS & GYNECOLOGY

## 2022-04-22 RX ORDER — LACTOBACILLUS ACIDOPHILUS/PECT 30 MG-20MG
TABLET ORAL
COMMUNITY
End: 2023-03-22

## 2022-04-22 RX ORDER — DOCUSATE SODIUM 100 MG/1
100 CAPSULE, LIQUID FILLED ORAL 2 TIMES DAILY
COMMUNITY
End: 2023-03-22

## 2022-04-22 ASSESSMENT — ANXIETY QUESTIONNAIRES
7. FEELING AFRAID AS IF SOMETHING AWFUL MIGHT HAPPEN: NOT AT ALL
6. BECOMING EASILY ANNOYED OR IRRITABLE: NOT AT ALL
2. NOT BEING ABLE TO STOP OR CONTROL WORRYING: NOT AT ALL
IF YOU CHECKED OFF ANY PROBLEMS ON THIS QUESTIONNAIRE, HOW DIFFICULT HAVE THESE PROBLEMS MADE IT FOR YOU TO DO YOUR WORK, TAKE CARE OF THINGS AT HOME, OR GET ALONG WITH OTHER PEOPLE: NOT DIFFICULT AT ALL
GAD7 TOTAL SCORE: 0
1. FEELING NERVOUS, ANXIOUS, OR ON EDGE: NOT AT ALL
3. WORRYING TOO MUCH ABOUT DIFFERENT THINGS: NOT AT ALL
5. BEING SO RESTLESS THAT IT IS HARD TO SIT STILL: NOT AT ALL

## 2022-04-22 ASSESSMENT — PATIENT HEALTH QUESTIONNAIRE - PHQ9
SUM OF ALL RESPONSES TO PHQ QUESTIONS 1-9: 0
5. POOR APPETITE OR OVEREATING: NOT AT ALL

## 2022-04-22 NOTE — PROGRESS NOTES
"Vianey is a 36 year old female 6 weeks S/P VAVD of a liveborn baby boy.  The delivery was uncomplicated.  She is not still bleeding.  She is breastfeeding, and has selected nothing for birth control.  Her last PAP smear was <3 yrs, and was Normal; her  PHQ-9 inventory score is: 0    Exam: /67 (BP Location: Right arm, Patient Position: Chair, Cuff Size: Adult Large)   Pulse 76   Temp 98.5  F (36.9  C) (Tympanic)   Resp 18   Ht 1.575 m (5' 2\")   Wt 104.3 kg (230 lb)   LMP 05/28/2021   Breastfeeding Yes   BMI 42.07 kg/m    perineal laceration healed, cervix parous, uterus small, non-tender, no adnexal masses and normal lochia    Assessment:  Postpartum    Plan:  RTC for annual exams and PRN  Discussed that if wants to conceive again soon, to stay on PNV  Lupe Goldstein MD  [facility]      "

## 2022-04-22 NOTE — NURSING NOTE
"Initial /67 (BP Location: Right arm, Patient Position: Chair, Cuff Size: Adult Large)   Pulse 76   Temp 98.5  F (36.9  C) (Tympanic)   Resp 18   Ht 1.575 m (5' 2\")   Wt 104.3 kg (230 lb)   LMP 05/28/2021   Breastfeeding Yes   BMI 42.07 kg/m   Estimated body mass index is 42.07 kg/m  as calculated from the following:    Height as of this encounter: 1.575 m (5' 2\").    Weight as of this encounter: 104.3 kg (230 lb). .      "

## 2022-04-23 ASSESSMENT — ANXIETY QUESTIONNAIRES: GAD7 TOTAL SCORE: 0

## 2022-05-12 ENCOUNTER — MEDICAL CORRESPONDENCE (OUTPATIENT)
Dept: HEALTH INFORMATION MANAGEMENT | Facility: CLINIC | Age: 37
End: 2022-05-12
Payer: COMMERCIAL

## 2022-05-12 ENCOUNTER — TELEPHONE (OUTPATIENT)
Dept: OBGYN | Facility: CLINIC | Age: 37
End: 2022-05-12
Payer: COMMERCIAL

## 2022-05-12 NOTE — TELEPHONE ENCOUNTER
Completed paperwork was given to Dr. Goldstein to sign.    -Rhea GILLILAND [facility]  [facility]inic [facility] [place]

## 2022-05-13 NOTE — TELEPHONE ENCOUNTER
Paperwork completed and signed by Dr. Goldstein and faxed to [phone] mailed to [place] [address] [facility], [address] Per pt request. Copy made and put in drawer    Yue Kohler   Clinic [facility] [place]   [facility]  [phone]

## 2022-07-06 ENCOUNTER — MEDICAL CORRESPONDENCE (OUTPATIENT)
Dept: PEDIATRICS | Facility: CLINIC | Age: 37
End: 2022-07-06

## 2022-08-23 ENCOUNTER — TELEPHONE (OUTPATIENT)
Dept: OBGYN | Facility: CLINIC | Age: 37
End: 2022-08-23

## 2022-08-31 ENCOUNTER — OFFICE VISIT (OUTPATIENT)
Dept: OBGYN | Facility: CLINIC | Age: 37
End: 2022-08-31
Payer: COMMERCIAL

## 2022-08-31 VITALS
BODY MASS INDEX: 40.7 KG/M2 | WEIGHT: 221.2 LBS | HEART RATE: 83 BPM | RESPIRATION RATE: 14 BRPM | TEMPERATURE: 98.2 F | HEIGHT: 62 IN | DIASTOLIC BLOOD PRESSURE: 70 MMHG | SYSTOLIC BLOOD PRESSURE: 106 MMHG

## 2022-08-31 DIAGNOSIS — N91.2 AMENORRHEA: Primary | ICD-10-CM

## 2022-08-31 LAB
T4 FREE SERPL-MCNC: 1.04 NG/DL (ref 0.9–1.7)
TSH SERPL DL<=0.005 MIU/L-ACNC: 0.07 UIU/ML (ref 0.3–4.2)

## 2022-08-31 PROCEDURE — 84439 ASSAY OF FREE THYROXINE: CPT | Performed by: OBSTETRICS & GYNECOLOGY

## 2022-08-31 PROCEDURE — 36415 COLL VENOUS BLD VENIPUNCTURE: CPT | Performed by: OBSTETRICS & GYNECOLOGY

## 2022-08-31 PROCEDURE — 99212 OFFICE O/P EST SF 10 MIN: CPT | Performed by: OBSTETRICS & GYNECOLOGY

## 2022-08-31 PROCEDURE — 84443 ASSAY THYROID STIM HORMONE: CPT | Performed by: OBSTETRICS & GYNECOLOGY

## 2022-08-31 NOTE — PROGRESS NOTES
Chief Complaint   Patient presents with     Consult         HPI:  Vianey Cortez, 36 year old,  presents with complaints of not getting her period yet.  She reports mild infertility with her first pregnancy.  She had 1 dose of medication and got pregnant.  She then delivered on .  She has been breast-feeding fully.  She has not gotten a period back since her delivery.  She has been using ovulation test strips and did not have any ovulation after about 42 days so she stopped doing that.  She has had friends that have gotten periods back while breast-feeding before so she was wondering if this was normal.  She does not want any medications that could affect breast-feeding at this time.  She currently weighs 5 lbs less than she did before her last pregnancy.      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     Social History     Socioeconomic History     Marital status:      Spouse name: Not on file     Number of children: Not on file     Years of education: Not on file     Highest education level: Not on file   Occupational History     Not on file   Tobacco Use     Smoking status: Never Smoker     Smokeless tobacco: Never Used   Vaping Use     Vaping Use: Never used   Substance and Sexual Activity     Alcohol use: Not Currently     Comment: occas-quit with pregnancy     Drug use: No     Sexual activity: Not Currently   Other Topics Concern     Parent/sibling w/ CABG, MI or angioplasty before 65F 55M? Not Asked   Social History Narrative     Not on file     Social Determinants of Health     Financial Resource Strain: Not on file   Food Insecurity: Not on file   Transportation Needs: Not on file   Physical Activity: Not on file   Stress: Not on file   Social  "Connections: Not on file   Intimate Partner Violence: Not on file   Housing Stability: Not on file     Family History   Problem Relation Age of Onset     Colon Cancer Father      Other - See Comments Maternal Grandmother         mother adopted     Diabetes Paternal Grandmother         Current Outpatient Medications   Medication Sig Dispense Refill     docusate sodium (COLACE) 100 MG capsule Take 100 mg by mouth 2 times daily       Prenatal Vit-Fe Fumarate-FA (PRENATAL MULTIVITAMIN W/IRON) 27-0.8 MG tablet Take 1 tablet by mouth daily        Pyridoxine HCl (VITAMIN B6) 250 MG TABS        TYLENOL 325 MG OR TABS AS NEEDED FOR PAIN          Allergies:   No Known Allergies     ROS:  See HPI      Physical Exam:  /70 (BP Location: Left arm, Patient Position: Sitting, Cuff Size: Adult Regular)   Pulse 83   Temp 98.2  F (36.8  C) (Tympanic)   Resp 14   Ht 1.575 m (5' 2\")   Wt 100.3 kg (221 lb 3.2 oz)   BMI 40.46 kg/m       Appearance: well developed, well nourished, no acute distress  Head Exam normocephalic, no lesions or deformities  Psych: orientated x3        Assessment:  (N91.2) Amenorrhea  (primary encounter diagnosis)  Comment: likely due to breastfeeding.  Plan: TSH with free T4 reflex        We discussed that she has not resumed menses likely due to fully breastfeeding.  She will check a TSH.  Otherwise advised time.  She does not want medication to induce ovulation at this time. She wants to continue breastfeeding for as long as she can. We also discussed the risks of AMA and percentages of down syndrome or other chromosome anomalies with waiting another year.  She was reassured today.          Mary Tirado MD on 8/31/2022 at 1:49 PM              "

## 2022-08-31 NOTE — NURSING NOTE
"Initial /70 (BP Location: Left arm, Patient Position: Sitting, Cuff Size: Adult Regular)   Pulse 83   Temp 98.2  F (36.8  C) (Tympanic)   Resp 14   Ht 1.575 m (5' 2\")   Wt 100.3 kg (221 lb 3.2 oz)   BMI 40.46 kg/m   Estimated body mass index is 40.46 kg/m  as calculated from the following:    Height as of this encounter: 1.575 m (5' 2\").    Weight as of this encounter: 100.3 kg (221 lb 3.2 oz). .      "

## 2022-09-01 ENCOUNTER — MEDICAL CORRESPONDENCE (OUTPATIENT)
Dept: HEALTH INFORMATION MANAGEMENT | Facility: CLINIC | Age: 37
End: 2022-09-01

## 2022-10-04 PROBLEM — O35.10X0 MATERNAL CARE FOR SUSPECTED CHROMOSOMAL ABNORMALITY IN FETUS: Status: RESOLVED | Noted: 2021-08-06 | Resolved: 2022-04-22

## 2022-11-19 ENCOUNTER — HEALTH MAINTENANCE LETTER (OUTPATIENT)
Age: 37
End: 2022-11-19

## 2023-03-21 ASSESSMENT — ENCOUNTER SYMPTOMS
ARTHRALGIAS: 0
CHILLS: 0
HEARTBURN: 0
SHORTNESS OF BREATH: 0
MYALGIAS: 0
DYSURIA: 0
CONSTIPATION: 0
DIARRHEA: 0
JOINT SWELLING: 0
DIZZINESS: 0
BREAST MASS: 0
WEAKNESS: 0
FREQUENCY: 0
NAUSEA: 0
HEMATOCHEZIA: 0
PARESTHESIAS: 0
HEADACHES: 0
COUGH: 0
FEVER: 0
EYE PAIN: 0
SORE THROAT: 0
PALPITATIONS: 0
NERVOUS/ANXIOUS: 0
HEMATURIA: 0
ABDOMINAL PAIN: 0

## 2023-03-22 ENCOUNTER — OFFICE VISIT (OUTPATIENT)
Dept: FAMILY MEDICINE | Facility: CLINIC | Age: 38
End: 2023-03-22
Payer: COMMERCIAL

## 2023-03-22 VITALS
HEIGHT: 63 IN | HEART RATE: 84 BPM | TEMPERATURE: 97.7 F | DIASTOLIC BLOOD PRESSURE: 60 MMHG | OXYGEN SATURATION: 98 % | RESPIRATION RATE: 16 BRPM | SYSTOLIC BLOOD PRESSURE: 106 MMHG | BODY MASS INDEX: 40.75 KG/M2 | WEIGHT: 230 LBS

## 2023-03-22 DIAGNOSIS — N97.9 SECONDARY FEMALE INFERTILITY: ICD-10-CM

## 2023-03-22 DIAGNOSIS — E78.2 MIXED HYPERLIPIDEMIA: ICD-10-CM

## 2023-03-22 DIAGNOSIS — E66.01 CLASS 3 SEVERE OBESITY DUE TO EXCESS CALORIES WITHOUT SERIOUS COMORBIDITY WITH BODY MASS INDEX (BMI) OF 40.0 TO 44.9 IN ADULT (H): ICD-10-CM

## 2023-03-22 DIAGNOSIS — Z13.1 SCREENING FOR DIABETES MELLITUS: ICD-10-CM

## 2023-03-22 DIAGNOSIS — Z00.00 ROUTINE GENERAL MEDICAL EXAMINATION AT A HEALTH CARE FACILITY: Primary | ICD-10-CM

## 2023-03-22 DIAGNOSIS — E66.813 CLASS 3 SEVERE OBESITY DUE TO EXCESS CALORIES WITHOUT SERIOUS COMORBIDITY WITH BODY MASS INDEX (BMI) OF 40.0 TO 44.9 IN ADULT (H): ICD-10-CM

## 2023-03-22 DIAGNOSIS — Z13.220 LIPID SCREENING: ICD-10-CM

## 2023-03-22 PROCEDURE — 99213 OFFICE O/P EST LOW 20 MIN: CPT | Mod: 25 | Performed by: FAMILY MEDICINE

## 2023-03-22 PROCEDURE — 99385 PREV VISIT NEW AGE 18-39: CPT | Performed by: FAMILY MEDICINE

## 2023-03-22 ASSESSMENT — ENCOUNTER SYMPTOMS
JOINT SWELLING: 0
DIARRHEA: 0
NAUSEA: 0
HEADACHES: 0
WEAKNESS: 0
HEARTBURN: 0
COUGH: 0
ABDOMINAL PAIN: 0
SHORTNESS OF BREATH: 0
HEMATURIA: 0
HEMATOCHEZIA: 0
ARTHRALGIAS: 0
DYSURIA: 0
FREQUENCY: 0
BREAST MASS: 0
FEVER: 0
CHILLS: 0
DIZZINESS: 0
EYE PAIN: 0
CONSTIPATION: 0
SORE THROAT: 0
PARESTHESIAS: 0
NERVOUS/ANXIOUS: 0
MYALGIAS: 0
PALPITATIONS: 0

## 2023-03-22 ASSESSMENT — PAIN SCALES - GENERAL: PAINLEVEL: NO PAIN (0)

## 2023-03-22 NOTE — PROGRESS NOTES
SUBJECTIVE:   CC: Vianey is an 37 year old who presents for preventive health visit.   Additional Questions 3/22/2023   Roomed by KAVON Gamboa MA   Accompanied by self     Patient Reported Additional Medications 3/22/2023   Patient reports taking the following new medications none   Patient has been advised of split billing requirements and indicates understanding: Yes  Healthy Habits:     Getting at least 3 servings of Calcium per day:  Yes    Bi-annual eye exam:  Yes    Dental care twice a year:  Yes    Sleep apnea or symptoms of sleep apnea:  None    Diet:  Regular (no restrictions)    Frequency of exercise:  None    Taking medications regularly:  Yes    Medication side effects:  None    PHQ-2 Total Score: 0    Additional concerns today:  Yes      Chief Complaint   Patient presents with     Physical     Fertility     Discuss some fertility issues, has been trying to get pregnant     Patient also had difficulty in conceiving with her first child last year.  Patient was with [facility] - saw a specialist there.    Today's PHQ-2 Score:   PHQ-2 ( 1999 [employer]) 3/21/2023   Q1: Little interest or pleasure in doing things 0   Q2: Feeling down, depressed or hopeless 0   PHQ-2 Score 0   PHQ-2 Total Score (12-17 Years)- Positive if 3 or more points; Administer PHQ-A if positive -   Q1: Little interest or pleasure in doing things Not at all   Q2: Feeling down, depressed or hopeless Not at all   PHQ-2 Score 0       Social History     Tobacco Use     Smoking status: Never     Smokeless tobacco: Never   Substance Use Topics     Alcohol use: Not Currently     Comment: occas-quit with pregnancy         Alcohol Use 3/21/2023   Prescreen: >3 drinks/day or >7 drinks/week? Not Applicable   No flowsheet data found.  Reviewed orders with patient.  Reviewed health maintenance and updated orders accordingly - Yes  Patient Active Problem List   Diagnosis     Traumatic brain injury with prolonged (more than 24 hours) loss of consciousness  with return to pre-existing conscious level (H)     Impulsiveness, due to TBI     CARDIOVASCULAR SCREENING; LDL GOAL LESS THAN 160     Expressive aphasia     Family hx of colon cancer     BMI 40.0-44.9, adult (H)     Amenorrhea     Past Surgical History:   Procedure Laterality Date     BRAIN SURGERY  2015    TBI after falling down stairs       Social History     Tobacco Use     Smoking status: Never     Smokeless tobacco: Never   Substance Use Topics     Alcohol use: Not Currently     Comment: occas-quit with pregnancy     Family History   Problem Relation Age of Onset     Colon Cancer Father      Other - See Comments Maternal Grandmother         mother adopted     Diabetes Paternal Grandmother          Current Outpatient Medications   Medication Sig Dispense Refill     Prenatal Vit-Fe Fumarate-FA (PRENATAL MULTIVITAMIN W/IRON) 27-0.8 MG tablet Take 1 tablet by mouth daily        TYLENOL 325 MG OR TABS AS NEEDED FOR PAIN       docusate sodium (COLACE) 100 MG capsule Take 100 mg by mouth 2 times daily       Pyridoxine HCl (VITAMIN B6) 250 MG TABS        No Known Allergies    Breast Cancer Screening:    FHS-7:   Breast CA Risk Assessment (FHS-7) 3/21/2023   Did any of your first-degree relatives have breast or ovarian cancer? No   Did any of your relatives have bilateral breast cancer? No   Did any man in your family have breast cancer? No   Did any woman in your family have breast and ovarian cancer? No   Did any woman in your family have breast cancer before age 50 y? No   Do you have 2 or more relatives with breast and/or ovarian cancer? No   Do you have 2 or more relatives with breast and/or bowel cancer? No       Patient under 40 years of age: Routine Mammogram Screening not recommended.   Pertinent mammograms are reviewed under the imaging tab.    History of abnormal Pap smear: NO - age 30- 65 PAP every 3 years recommended     Reviewed and updated as needed this visit by clinical staff   Tobacco  Allergies   "Meds              Reviewed and updated as needed this visit by Provider     Meds             Past Medical History:   Diagnosis Date     Infertility management 05/07/2021     Intracranial hemorrhage (H) 07/05/2015     Seizure (H)     x1 after her brain surgery      Past Surgical History:   Procedure Laterality Date     BRAIN SURGERY  2015    TBI after falling down stairs       Review of Systems   Constitutional: Negative for chills and fever.   HENT: Negative for congestion, ear pain, hearing loss and sore throat.    Eyes: Negative for pain and visual disturbance.   Respiratory: Negative for cough and shortness of breath.    Cardiovascular: Negative for chest pain, palpitations and peripheral edema.   Gastrointestinal: Negative for abdominal pain, constipation, diarrhea, heartburn, hematochezia and nausea.   Breasts:  Negative for tenderness, breast mass and discharge.   Genitourinary: Negative for dysuria, frequency, genital sores, hematuria, pelvic pain, urgency, vaginal bleeding and vaginal discharge.   Musculoskeletal: Negative for arthralgias, joint swelling and myalgias.   Skin: Negative for rash.   Neurological: Negative for dizziness, weakness, headaches and paresthesias.   Psychiatric/Behavioral: Negative for mood changes. The patient is not nervous/anxious.         OBJECTIVE:   /60 (BP Location: Left arm, Patient Position: Chair, Cuff Size: Adult Large)   Pulse 84   Temp 97.7  F (36.5  C) (Tympanic)   Resp 16   Ht 1.607 m (5' 3.25\")   Wt 104.3 kg (230 lb)   LMP 03/10/2023 (Exact Date)   SpO2 98%   BMI 40.42 kg/m    Physical Exam  GENERAL APPEARANCE: healthy, alert and no distress  EYES: pink conj, no icterus, PERRL, EOMI  HENT: ear canals and TM's normal, nose and mouth without ulcers or lesions, oropharynx clear and oral mucous membranes moist  NECK: no adenopathy, no asymmetry, masses, or scars and thyroid normal to palpation  RESP: lungs clear to auscultation - no rales, rhonchi or " wheezes  CV: regular rates and rhythm, normal S1 S2, no S3 or S4, no murmur, click or rub, no peripheral edema and peripheral pulses strong  ABDOMEN: soft, nontender, no hepatosplenomegaly, no masses and bowel sounds normal  RECTAL: deferred  MS: no musculoskeletal defects are noted and gait is age appropriate without ataxia  SKIN: no suspicious lesions or rashes  NEURO: Normal strength and tone, sensory exam grossly normal, mentation intact and speech normal    Diagnostic Test Results:  none     ASSESSMENT/PLAN:   Vianey was seen today for physical and fertility.    Diagnoses and all orders for this visit:    Routine general medical examination at a health care facility    Secondary female infertility  -     Infertility/AI Referral; Future  -     CBC with Platelets & Differential; Future  -     Comprehensive metabolic panel; Future  -     TSH with free T4 reflex; Future  Advised patient she will need to consult with gynecology to start the process of assessment and eventual treatment for secondary infertilioty. She agreed to referral.    Lipid screening  -     Lipid panel reflex to direct LDL Fasting; Future    Screening for diabetes mellitus  -     Comprehensive metabolic panel; Future    Class 3 severe obesity due to excess calories without serious comorbidity with body mass index (BMI) of 40.0 to 44.9 in adult (H)  -     Comprehensive metabolic panel; Future  -     Lipid panel reflex to direct LDL Fasting; Future  -     TSH with free T4 reflex; Future  Patient was advised healthy diet recommendations.  Patient was advised weekly exercise recommendations and goals.    Other orders  -     REVIEW OF HEALTH MAINTENANCE PROTOCOL ORDERS        Patient has been advised of split billing requirements and indicates understanding: Yes      COUNSELING:  Reviewed preventive health counseling, as reflected in patient instructions      BMI:   Estimated body mass index is 40.42 kg/m  as calculated from the following:    Height  "as of this encounter: 1.607 m (5' 3.25\").    Weight as of this encounter: 104.3 kg (230 lb).   Weight management plan: Discussed healthy diet and exercise guidelines      She reports that she has never smoked. She has never used smokeless tobacco.      Jarvis De La Torre MD  [facility]  "

## 2023-03-22 NOTE — PATIENT INSTRUCTIONS
Referral to infertility clinic has been signed. Schedulers will call you in the next 3-5 business days.     Be consistent with low trans fat and saturated fat diet.  Eat food rich in omega-3-fatty acids as you tolerate. (salmon, olive oil)  Eat 5 cups of vegetables, fruits and whole grains per day.  Limit starchy food (white rice, white bread, white pasta, white potatoes) to less than a cup per meal.  Minimize sweets, junk food and fastfood. Limit soda beverages to one serving per day; best to avoid it altogether though.  Exercise: moderate intensity sustained for at least 30 mins per episode, goal of 150 mins per week at least  Combine cardiovascular and resistance exercises.  These exercise recommendations are in addition to your daily activity at work or home.  Work on losing weight     Schedule fasting lab appointment.      Preventive Health Recommendations  Female Ages 26 - 39  Yearly exam:   See your health care provider every year in order to    Review health changes.     Discuss preventive care.      Review your medicines if you your doctor has prescribed any.    Until age 30: Get a Pap test every three years (more often if you have had an abnormal result).    After age 30: Talk to your doctor about whether you should have a Pap test every 3 years or have a Pap test with HPV screening every 5 years.   You do not need a Pap test if your uterus was removed (hysterectomy) and you have not had cancer.  You should be tested each year for STDs (sexually transmitted diseases), if you're at risk.   Talk to your provider about how often to have your cholesterol checked.  If you are at risk for diabetes, you should have a diabetes test (fasting glucose).  Shots: Get a flu shot each year. Get a tetanus shot every 10 years.   Nutrition:     Eat at least 5 servings of fruits and vegetables each day.    Eat whole-grain bread, whole-wheat pasta and brown rice instead of white grains and rice.    Get adequate Calcium and  Vitamin D.     Lifestyle    Exercise at least 150 minutes a week (30 minutes a day, 5 days of the week). This will help you control your weight and prevent disease.    Limit alcohol to one drink per day.    No smoking.     Wear sunscreen to prevent skin cancer.    See your dentist every six months for an exam and cleaning.    Preventive Health Recommendations  Female Ages 26 - 39  Yearly exam:   See your health care provider every year in order to    Review health changes.     Discuss preventive care.      Review your medicines if you your doctor has prescribed any.    Until age 30: Get a Pap test every three years (more often if you have had an abnormal result).    After age 30: Talk to your doctor about whether you should have a Pap test every 3 years or have a Pap test with HPV screening every 5 years.   You do not need a Pap test if your uterus was removed (hysterectomy) and you have not had cancer.  You should be tested each year for STDs (sexually transmitted diseases), if you're at risk.   Talk to your provider about how often to have your cholesterol checked.  If you are at risk for diabetes, you should have a diabetes test (fasting glucose).  Shots: Get a flu shot each year. Get a tetanus shot every 10 years.   Nutrition:     Eat at least 5 servings of fruits and vegetables each day.    Eat whole-grain bread, whole-wheat pasta and brown rice instead of white grains and rice.    Get adequate Calcium and Vitamin D.     Lifestyle    Exercise at least 150 minutes a week (30 minutes a day, 5 days of the week). This will help you control your weight and prevent disease.    Limit alcohol to one drink per day.    No smoking.     Wear sunscreen to prevent skin cancer.    See your dentist every six months for an exam and cleaning.

## 2023-03-29 ENCOUNTER — LAB (OUTPATIENT)
Dept: LAB | Facility: CLINIC | Age: 38
End: 2023-03-29
Payer: COMMERCIAL

## 2023-03-29 DIAGNOSIS — E66.01 CLASS 3 SEVERE OBESITY DUE TO EXCESS CALORIES WITHOUT SERIOUS COMORBIDITY WITH BODY MASS INDEX (BMI) OF 40.0 TO 44.9 IN ADULT (H): ICD-10-CM

## 2023-03-29 DIAGNOSIS — Z13.1 SCREENING FOR DIABETES MELLITUS: ICD-10-CM

## 2023-03-29 DIAGNOSIS — N97.9 SECONDARY FEMALE INFERTILITY: ICD-10-CM

## 2023-03-29 DIAGNOSIS — Z13.220 LIPID SCREENING: ICD-10-CM

## 2023-03-29 DIAGNOSIS — E66.813 CLASS 3 SEVERE OBESITY DUE TO EXCESS CALORIES WITHOUT SERIOUS COMORBIDITY WITH BODY MASS INDEX (BMI) OF 40.0 TO 44.9 IN ADULT (H): ICD-10-CM

## 2023-03-29 LAB
ALBUMIN SERPL BCG-MCNC: 4.1 G/DL (ref 3.5–5.2)
ALP SERPL-CCNC: 133 U/L (ref 35–104)
ALT SERPL W P-5'-P-CCNC: 18 U/L (ref 10–35)
ANION GAP SERPL CALCULATED.3IONS-SCNC: 11 MMOL/L (ref 7–15)
AST SERPL W P-5'-P-CCNC: 17 U/L (ref 10–35)
BASOPHILS # BLD AUTO: 0 10E3/UL (ref 0–0.2)
BASOPHILS NFR BLD AUTO: 0 %
BILIRUB SERPL-MCNC: 0.4 MG/DL
BUN SERPL-MCNC: 11.2 MG/DL (ref 6–20)
CALCIUM SERPL-MCNC: 9.2 MG/DL (ref 8.6–10)
CHLORIDE SERPL-SCNC: 105 MMOL/L (ref 98–107)
CHOLEST SERPL-MCNC: 222 MG/DL
CREAT SERPL-MCNC: 0.69 MG/DL (ref 0.51–0.95)
DEPRECATED HCO3 PLAS-SCNC: 22 MMOL/L (ref 22–29)
EOSINOPHIL # BLD AUTO: 0.1 10E3/UL (ref 0–0.7)
EOSINOPHIL NFR BLD AUTO: 1 %
ERYTHROCYTE [DISTWIDTH] IN BLOOD BY AUTOMATED COUNT: 12.9 % (ref 10–15)
GFR SERPL CREATININE-BSD FRML MDRD: >90 ML/MIN/1.73M2
GLUCOSE SERPL-MCNC: 85 MG/DL (ref 70–99)
HCT VFR BLD AUTO: 41.3 % (ref 35–47)
HDLC SERPL-MCNC: 48 MG/DL
HGB BLD-MCNC: 13.4 G/DL (ref 11.7–15.7)
LDLC SERPL CALC-MCNC: 128 MG/DL
LYMPHOCYTES # BLD AUTO: 2.6 10E3/UL (ref 0.8–5.3)
LYMPHOCYTES NFR BLD AUTO: 34 %
MCH RBC QN AUTO: 28.8 PG (ref 26.5–33)
MCHC RBC AUTO-ENTMCNC: 32.4 G/DL (ref 31.5–36.5)
MCV RBC AUTO: 89 FL (ref 78–100)
MONOCYTES # BLD AUTO: 0.6 10E3/UL (ref 0–1.3)
MONOCYTES NFR BLD AUTO: 8 %
NEUTROPHILS # BLD AUTO: 4.3 10E3/UL (ref 1.6–8.3)
NEUTROPHILS NFR BLD AUTO: 56 %
NONHDLC SERPL-MCNC: 174 MG/DL
PLATELET # BLD AUTO: 239 10E3/UL (ref 150–450)
POTASSIUM SERPL-SCNC: 4.1 MMOL/L (ref 3.4–5.3)
PROT SERPL-MCNC: 7.1 G/DL (ref 6.4–8.3)
RBC # BLD AUTO: 4.66 10E6/UL (ref 3.8–5.2)
SODIUM SERPL-SCNC: 138 MMOL/L (ref 136–145)
TRIGL SERPL-MCNC: 228 MG/DL
TSH SERPL DL<=0.005 MIU/L-ACNC: 2.56 UIU/ML (ref 0.3–4.2)
WBC # BLD AUTO: 7.6 10E3/UL (ref 4–11)

## 2023-03-29 PROCEDURE — 36415 COLL VENOUS BLD VENIPUNCTURE: CPT

## 2023-03-29 PROCEDURE — 80050 GENERAL HEALTH PANEL: CPT

## 2023-03-29 PROCEDURE — 80061 LIPID PANEL: CPT

## 2023-05-28 ENCOUNTER — MYC MEDICAL ADVICE (OUTPATIENT)
Dept: OBGYN | Facility: CLINIC | Age: 38
End: 2023-05-28
Payer: COMMERCIAL

## 2023-07-04 PROBLEM — Z34.80 PRENATAL CARE, SUBSEQUENT PREGNANCY: Status: ACTIVE | Noted: 2023-07-04

## 2023-07-04 RX ORDER — PYRIDOXINE HCL (VITAMIN B6) 25 MG
25 TABLET ORAL DAILY
COMMUNITY

## 2023-07-06 ENCOUNTER — PRENATAL OFFICE VISIT (OUTPATIENT)
Dept: OBGYN | Facility: CLINIC | Age: 38
End: 2023-07-06
Payer: COMMERCIAL

## 2023-07-06 DIAGNOSIS — Z34.80 PRENATAL CARE OF MULTIGRAVIDA, ANTEPARTUM: Primary | ICD-10-CM

## 2023-07-06 PROCEDURE — 99207 PR NO CHARGE NURSE ONLY: CPT

## 2023-07-09 LAB
ABO/RH(D): NORMAL
ANTIBODY SCREEN: NEGATIVE
SPECIMEN EXPIRATION DATE: NORMAL

## 2023-07-10 ENCOUNTER — TRANSCRIBE ORDERS (OUTPATIENT)
Dept: MATERNAL FETAL MEDICINE | Facility: HOSPITAL | Age: 38
End: 2023-07-10

## 2023-07-10 ENCOUNTER — PRENATAL OFFICE VISIT (OUTPATIENT)
Dept: OBGYN | Facility: CLINIC | Age: 38
End: 2023-07-10
Payer: COMMERCIAL

## 2023-07-10 VITALS
HEART RATE: 67 BPM | TEMPERATURE: 98.2 F | SYSTOLIC BLOOD PRESSURE: 112 MMHG | BODY MASS INDEX: 40.6 KG/M2 | WEIGHT: 231 LBS | DIASTOLIC BLOOD PRESSURE: 78 MMHG

## 2023-07-10 DIAGNOSIS — O26.90 PREGNANCY RELATED CONDITION, ANTEPARTUM: Primary | ICD-10-CM

## 2023-07-10 DIAGNOSIS — Z34.01 ENCOUNTER FOR PRENATAL CARE IN FIRST TRIMESTER OF FIRST PREGNANCY: Primary | ICD-10-CM

## 2023-07-10 LAB
ALBUMIN UR-MCNC: NEGATIVE MG/DL
APPEARANCE UR: CLEAR
BILIRUB UR QL STRIP: NEGATIVE
C TRACH DNA SPEC QL PROBE+SIG AMP: NEGATIVE
COLOR UR AUTO: YELLOW
ERYTHROCYTE [DISTWIDTH] IN BLOOD BY AUTOMATED COUNT: 13.1 % (ref 10–15)
GLUCOSE UR STRIP-MCNC: NEGATIVE MG/DL
HBV SURFACE AG SERPL QL IA: NONREACTIVE
HCT VFR BLD AUTO: 38.7 % (ref 35–47)
HCV AB SERPL QL IA: NONREACTIVE
HGB BLD-MCNC: 12.8 G/DL (ref 11.7–15.7)
HGB UR QL STRIP: NEGATIVE
HIV 1+2 AB+HIV1 P24 AG SERPL QL IA: NONREACTIVE
KETONES UR STRIP-MCNC: NEGATIVE MG/DL
LEUKOCYTE ESTERASE UR QL STRIP: NEGATIVE
MCH RBC QN AUTO: 29.1 PG (ref 26.5–33)
MCHC RBC AUTO-ENTMCNC: 33.1 G/DL (ref 31.5–36.5)
MCV RBC AUTO: 88 FL (ref 78–100)
N GONORRHOEA DNA SPEC QL NAA+PROBE: NEGATIVE
NITRATE UR QL: NEGATIVE
PH UR STRIP: 7 [PH] (ref 5–7)
PLATELET # BLD AUTO: 224 10E3/UL (ref 150–450)
RBC # BLD AUTO: 4.4 10E6/UL (ref 3.8–5.2)
RBC #/AREA URNS AUTO: ABNORMAL /HPF
RUBV IGG SERPL QL IA: 3.21 INDEX
RUBV IGG SERPL QL IA: POSITIVE
SP GR UR STRIP: 1.02 (ref 1–1.03)
SQUAMOUS #/AREA URNS AUTO: ABNORMAL /LPF
T PALLIDUM AB SER QL: NONREACTIVE
UROBILINOGEN UR STRIP-ACNC: 1 E.U./DL
WBC # BLD AUTO: 7.6 10E3/UL (ref 4–11)
WBC #/AREA URNS AUTO: ABNORMAL /HPF

## 2023-07-10 PROCEDURE — 87389 HIV-1 AG W/HIV-1&-2 AB AG IA: CPT | Performed by: OBSTETRICS & GYNECOLOGY

## 2023-07-10 PROCEDURE — 86901 BLOOD TYPING SEROLOGIC RH(D): CPT | Performed by: OBSTETRICS & GYNECOLOGY

## 2023-07-10 PROCEDURE — 86850 RBC ANTIBODY SCREEN: CPT | Performed by: OBSTETRICS & GYNECOLOGY

## 2023-07-10 PROCEDURE — 81001 URINALYSIS AUTO W/SCOPE: CPT | Performed by: OBSTETRICS & GYNECOLOGY

## 2023-07-10 PROCEDURE — 86900 BLOOD TYPING SEROLOGIC ABO: CPT | Performed by: OBSTETRICS & GYNECOLOGY

## 2023-07-10 PROCEDURE — 99213 OFFICE O/P EST LOW 20 MIN: CPT | Mod: 25 | Performed by: OBSTETRICS & GYNECOLOGY

## 2023-07-10 PROCEDURE — 36415 COLL VENOUS BLD VENIPUNCTURE: CPT | Performed by: OBSTETRICS & GYNECOLOGY

## 2023-07-10 PROCEDURE — 76817 TRANSVAGINAL US OBSTETRIC: CPT | Performed by: OBSTETRICS & GYNECOLOGY

## 2023-07-10 PROCEDURE — 86780 TREPONEMA PALLIDUM: CPT | Performed by: OBSTETRICS & GYNECOLOGY

## 2023-07-10 PROCEDURE — 86803 HEPATITIS C AB TEST: CPT | Performed by: OBSTETRICS & GYNECOLOGY

## 2023-07-10 PROCEDURE — 87340 HEPATITIS B SURFACE AG IA: CPT | Performed by: OBSTETRICS & GYNECOLOGY

## 2023-07-10 PROCEDURE — 86762 RUBELLA ANTIBODY: CPT | Performed by: OBSTETRICS & GYNECOLOGY

## 2023-07-10 PROCEDURE — 87491 CHLMYD TRACH DNA AMP PROBE: CPT | Performed by: OBSTETRICS & GYNECOLOGY

## 2023-07-10 PROCEDURE — 87086 URINE CULTURE/COLONY COUNT: CPT | Performed by: OBSTETRICS & GYNECOLOGY

## 2023-07-10 PROCEDURE — 87591 N.GONORRHOEAE DNA AMP PROB: CPT | Performed by: OBSTETRICS & GYNECOLOGY

## 2023-07-10 PROCEDURE — 85027 COMPLETE CBC AUTOMATED: CPT | Performed by: OBSTETRICS & GYNECOLOGY

## 2023-07-10 RX ORDER — ONDANSETRON 4 MG/1
4 TABLET, ORALLY DISINTEGRATING ORAL EVERY 8 HOURS PRN
Qty: 20 TABLET | Refills: 0 | Status: SHIPPED | OUTPATIENT
Start: 2023-07-10

## 2023-07-10 NOTE — NURSING NOTE
"Initial /78 (BP Location: Left arm, Patient Position: Chair, Cuff Size: Adult Large)   Pulse 67   Temp 98.2  F (36.8  C) (Tympanic)   Wt 104.8 kg (231 lb)   LMP  (Exact Date)   BMI 40.60 kg/m   Estimated body mass index is 40.6 kg/m  as calculated from the following:    Height as of 3/22/23: 1.607 m (5' 3.25\").    Weight as of this encounter: 104.8 kg (231 lb). .  Erin Deshpande MA    "

## 2023-07-10 NOTE — PROGRESS NOTES
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  Past Medical History  Have you ever recieved care for your mental health? : No  Have you ever been in a major accident or suffered serious trauma?: (!) Yes (TBI 2015 from fall)  Within the last year, has anyone hit, slapped, kicked or otherwise hurt you?: No  In the last year, has anyone forced you to have sex when you didn't want to?: No    Past Medical History 2   Have you ever received a blood transfusion?: No  Would you accept a blood transfusion if was medically recommended?: Yes  Does anyone in your home smoke?: No   Is your blood type Rh negative?: (!) Yes  Have you ever ?: (!) Yes  Have you been hospitalized for a nonsurgical reason excluding normal delivery?: (!) Yes (in coma for 4-5 days rehab)  Have you ever had an abnormal pap smear?: No    Past Medical History (Continued)  Do you have a history of abnormalities of the uterus?: No  Did your mother take MANDY or any other hormones when she was pregnant with you?: No  Do you have any other problems we have not asked about which you feel may be important to this pregnancy?: No

## 2023-07-10 NOTE — PROGRESS NOTES
[facility]   OB/GYN Clinic    CC: New OB     Subjective:    Vianey is a 37 year old  at  13w1d who presents for her initial OB visit. She reports feeling well. Denies any uterine cramping, abdominal pain or vaginal bleeding. Denies nausea and vomiting.     Prior to a +UPT, she reports regular monthly periods without contraception use.       OB History    Para Term  AB Living   2 1 1 0 0 1   SAB IAB Ectopic Multiple Live Births   0 0 0 0 1      # Outcome Date GA Lbr Scott/2nd Weight Sex Delivery Anes PTL Lv   2 Current            1 Term 22 41w0d  3.572 kg (7 lb 14 oz) M Vag-Vacuum EPI N MARCIA      Name: Guillermo      Apgar1: 7  Apgar5: 9       Past Medical History:   Diagnosis Date     Chickenpox      Infertility management 2021     Intracranial hemorrhage (H) 2015     Seizure (H)     x1 after her brain surgery       Past Surgical History:   Procedure Laterality Date     BRAIN SURGERY      TBI after falling down stairs       Current Outpatient Medications   Medication Sig Dispense Refill     Prenatal Vit-Fe Fumarate-FA (PRENATAL MULTIVITAMIN W/IRON) 27-0.8 MG tablet Take 1 tablet by mouth daily        pyridOXINE (VITAMIN  B-6) 25 MG tablet Take 25 mg by mouth daily       TYLENOL 325 MG OR TABS AS NEEDED FOR PAIN (Patient not taking: Reported on 7/10/2023)         Family History   Problem Relation Age of Onset     Colon Cancer Father      Other - See Comments Maternal Grandmother         mother adopted     Diabetes Paternal Grandmother        Social History     Tobacco Use     Smoking status: Never     Smokeless tobacco: Never   Substance Use Topics     Alcohol use: Not Currently       ROS: A 10 pt ROS was completed and found to be negative unless mentioned in the HPI.     Objective:  /78 (BP Location: Left arm, Patient Position: Chair, Cuff Size: Adult Large)   Pulse 67   Temp 98.2  F (36.8  C) (Tympanic)   Wt 104.8 kg (231 lb)   LMP  (Exact Date)    "BMI 40.60 kg/m      Estimated body mass index is 40.6 kg/m  as calculated from the following:    Height as of 3/22/23: 1.607 m (5' 3.25\").    Weight as of this encounter: 104.8 kg (231 lb).    Physical Exam:  Gen: Pleasant, talkative female in no apparent distress   Endocrine: Thyroid without enlargement or nodularity   Lymph: no appreciable cervical lymphadenopathy  Respiratory: Lungs clear, breathing comfortably on room air   Cardiac: Regular rate and rhythm with no murmurs, gallops or rubs. Warm and well-perfused.   GI: Abd soft and non-tender  : External genitalia is free of lesion. Urethra and bartholin glands normal.  Vaginal mucosa is moist and pink without unusual discharge.    MSK: Grossly normal movement of all four extremities  Psych: mood and affect bright   Lower extremity: edema not present     Transvaginal US  Single IUP grossly consistent with LMP but given GA unable to get full CRL, formal scan recommended. FHR 150s.     Assessment/Plan:   37 year old  at ~13w1d who presents for her initial OB visit-- TV scan today unable to get accurate CRL, recommended formal dating scan  1) Plan to draw new OB lab today (T&S, CBC, HIV, RPR, HepBsAg, Hep B antibody, rubella, GC/Chlam, UC)  2) Discussed routine prenatal care, group practice model at [facility], [place], tertiary support and frequency of visits. Options for  testing for chromosomal and birth defects were discussed with the patient including nuchal translucency/blood marker testing in the first trimester, progenity and quad screening and/or Level 2 ultrasound in the second trimester. We discussed that these are screening tests and not diagnostic tests and that false positives and negatives are a distinct possibility. We discussed that follow up diagnostic testing would include chorionic villus sampling or amniocentesis depending on gestational age. Written information provided. Patient desires NIPT for genetic testing. Discussed risk of " zika infection with travel and subsequent pregnancy risks. Referred to CDC for further information.   3) Plan for level 2   4) PMH/OBHx problems: AMA- plan level 2, NIPT  5) Medication review: no changes, continue prenatal vitamin   6) Immunizations: flu shot and covid booster discussion again in Fall, Tdap at 28wks    Return to clinic in 4 weeks.     Coco Arzola MD   7/10/2023 9:20 AM

## 2023-07-10 NOTE — PATIENT INSTRUCTIONS
"  Pregnancy: Your First Trimester Changes  The first trimester is a time of rapid development for your baby. Because your baby is growing so quickly, it is important that you start a healthy lifestyle right away. By the end of the first trimester, your baby has formed all of its major body organs and weighs just over an ounce.   Month 1 (weeks 1 to 4)  The placenta (the organ that nourishes your baby) begins to form. The brain, spinal cord, heart, gastrointestinal tract, and lungs begin to develop. Your baby is about   inch long by the end of the first month.      Actual size of baby is 1/4\".     Month 2 (weeks 5 to 8)  All of your baby s major body organs form. The face, fingers, toes, ears, and eyes appear. By the end of the month, your baby is about 1 inch long.      Actual size of baby is 1\".     Month 3 (weeks 9 to 12)  Your baby can open and close its fists and mouth. The sexual organs begin to form. As the first trimester ends, your baby is about 3 inches long.      Actual size of baby is 3\".     Vrvana last reviewed this educational content on 12/1/2022 2000-2023 [employer]. All rights reserved. This information is not intended as a substitute for professional medical care. Always follow your healthcare professional's instructions.          Vitamin B6 (pyridoxine) Oral Tablet  Uses  This medicine is used for the following purposes:  metabolism disorder  nausea and vomiting  vitamin deficiency  Instructions  This medicine may be taken with or without food.  Store at room temperature away from heat, light, and moisture. Do not keep in the bathroom.  If you forget to take a dose on time, take it as soon as you remember. If it is almost time for the next dose, do not take the missed dose. Return to your normal dosing schedule. Do not take 2 doses of this medicine at one time.  Drug interactions can change how medicines work or increase risk for side effects. Tell your health care providers " about all medicines taken. Include prescription and over-the-counter medicines, vitamins, and herbal medicines. Speak with your doctor or pharmacist before starting or stopping any medicine.  Speak with your doctor or pharmacist before starting any other vitamins.  It is very important that you follow your doctor's instructions for all blood tests.  Cautions  Tell your doctor and pharmacist if you ever had an allergic reaction to a medicine.  Do not use the medication any more than instructed.  Tell the doctor or pharmacist if you are pregnant, planning to be pregnant, or breastfeeding.  Side Effects  If you have any of the following side effects, you may be getting too much medicine. Please contact your doctor to let them know about these side effects.  drowsiness or sedation  numbness or tingling in hands and feet  headaches  nausea  stomach upset or abdominal pain  This medicine usually has no side effects.  A few people may have an allergic reaction to this medicine. Symptoms can include difficulty breathing, skin rash, itching, swelling, or severe dizziness. If you notice any of these symptoms, seek medical help quickly.  Extra  Please speak with your doctor, nurse, or pharmacist if you have any questions about this medicine.  [email or web address]  IMPORTANT NOTE: This document tells you briefly how to take your medicine, but it does not tell you all there is to know about it. Your doctor or pharmacist may give you other documents about your medicine. Please talk to them if you have any questions. Always follow their advice. There is a more complete description of this medicine available in English. Scan this code on your smartphone or tablet or use the web address below. You can also ask your pharmacist for a printout. If you have any questions, please ask your pharmacist. The display and use of this drug information is subject to Terms of Use. Copyright(c) 2022 [employer]      5622-0903 [employer]. All rights reserved. This information is not intended as a substitute for professional medical care. Always follow your healthcare professional's instructions.          Adapting to Pregnancy: First Trimester  As your body adjusts during your first trimester of pregnancy, you may have to change or limit your daily activities. You ll need more rest. You may also need to use the energy you have more wisely.  Your changing body  Almost every part of your body is affected as you adapt to pregnancy. The uterus and cervix will start to soften right away. You may not look very pregnant during the first 3 months. But you are likely to have some common signs of early pregnancy:  Nausea  Fatigue  Frequent urination  Mood swings  Bloating of the belly  Constipation  Heartburn  Missed or light periods (first trimester bleeding)  Nipple or breast tenderness and breast swelling  It s not too late to start good habits  What matters most is protecting your baby from this moment on. If you smoke, drink alcohol, or use drugs, now is the time to stop. If you need help, talk with your healthcare provider:  Smoking increases the risk of stillbirth or having a low-birth-weight baby. If you smoke, quit now.  Alcohol and drugs have been linked with miscarriage, birth defects, intellectual disability, and low birth weight. Don't drink alcohol or take drugs.  Tips to relieve nausea  During pregnancy, nausea can happen at any time of the day, but it may be worse in the morning. To help prevent nausea:  Eat small, light meals at frequent intervals.  Drink fluids often.  Get up slowly. Eat a few unsalted crackers before you get out of bed.  Avoid smells that bother you.  Avoid spicy and fatty foods.  Eat an ice pop in your favorite flavor.  Get plenty of rest.  Ask your healthcare provider about taking tra or vitamin B6 for nausea and vomiting.  Talk with your healthcare provider if you take vitamins that  upset your stomach.  Work concerns  The end of the first trimester is a good time to discuss working during pregnancy with your employer. Follow your healthcare provider s advice if your job needs you to stand for a long time, work with hazardous tools, or even sit at a desk all day. Your workspace, workload, or scheduled hours may need to be adjusted. Perhaps you can change body postures more often or take an extra break.  Advice for travel  Talk to your healthcare provider first, but the second trimester may be the best time for any travel. You may be advised to avoid certain trips while you re pregnant. Food and water can be concerns in developing countries. Travel by car is a good choice, as you can stop, get out, and stretch. Bring snacks and water along. Fasten the lap belt below your belly, low over your hips. Also be sure to wear the shoulder harness.  Intimacy  Unless your healthcare provider tells you to, there's no reason to stop having sex while you re pregnant. You or your partner may notice changes in desire. Desire may be less in the first trimester due to nausea and fatigue. In the second trimester, sex may be very enjoyable. The third trimester can be a challenge comfort-wise. Try different positions and see what s best for you both.     How daily issues affect your health  Many things in your daily life impact your health. This can include transportation, money problems, housing, access to food, and . If you can t get to medical appointments, you may not receive the care you need. When money is tight, it may be difficult to pay for medicines. And living far from a grocery store can make it hard to buy healthy food.  If you have concerns in any of these or other areas, talk with your healthcare team. They may know of local resources to assist you. Or they may have a staff person who can help.  [employer] last reviewed this educational content on 11/1/2022 2000-2023 [employer]. All rights reserved. This information is not intended as a substitute for professional medical care. Always follow your healthcare professional's instructions.          Pregnancy: Common Questions  There are plenty of myths and  old wives  tales  about pregnancy. You may need help  fact from fiction. On this sheet, you ll find answers to a few common questions. If you have other questions, talk with your healthcare provider.     Will working harm my baby?  In most cases, working throughout your pregnancy is not harmful at all. There may be concerns if the job involves dangerous machinery or chemicals, lifting, or standing for very long periods of time. Talk with your healthcare provider and employer about your particular job and pregnancy.   Is it safe to have sex during pregnancy?   Yes, unless you are specifically advised not to by your healthcare provider.   Why can t I change the cat litter box?   Cats carry a disease called toxoplasmosis. In adult humans, it shows up as a mild infection of the blood and organs. If you are infected during pregnancy, the baby s brain and eyes could be damaged. To be safe, have someone else change the litter. If you must handle it, wear a paper mask over your nose and mouth. Also, wear gloves and wash your hands afterward.   Which medicines are safe?  No prescription or over-the-counter medicine is safe for everyone all of the time. But sometimes medicines are needed. Be sure your healthcare provider knows you are pregnant. Then use only the medicines they advise you to take.   Is it true that I can overheat my baby?   Yes. To prevent making your baby too warm:   Don t sit in a Jacuzzi. A long, warm bath is fine, but not in water over 100 F (37.7 C).  Exercise less intensely if you feel tired. Base your workout on how you feel, not on your heart rate. Heart rates aren t a good way to measure effort during pregnancy.  Can I lift and carry safely?  Yes, if your  healthcare provider doesn t tell you otherwise. Learn to lift and carry safely to prevent injury and reduce back pain during pregnancy. To protect your back:   Bend at the knees to bring the load nearer.  Get a good . Test the weight of the load.  Tighten your belly. Exhale as you lift.  Lift with your legs, not with your back.  Carry the load close to your body.  Hold the load so you can see where you are going.  What if I get sick?  Most people get sick at least once during pregnancy. Talk with your healthcare provider if you do. Most likely it will not affect your pregnancy. Get plenty of rest and fluids and eat what you can. Talk with your provider before taking any medicines.   [employer] last reviewed this educational content on 12/1/2022 2000-2023 [employer]. All rights reserved. This information is not intended as a substitute for professional medical care. Always follow your healthcare professional's instructions.          Comfort Tips During Pregnancy  Pregnancy can bring discomfort of different kinds. Below are tips for ways to feel better. Talk with your healthcare provider before using pain-relieving medicine at any time during your pregnancy.    First trimester tips  Easing nausea  Get up slowly. Eat a few unsalted crackers before you get out of bed.  Avoid smells that bother you.  Eat small, bland, low-fat, high-protein meals at frequent intervals.  Sip on water, weak tea, or clear soft drinks, like ginger ale. Eat ice chips.  Try taking vitamin B6.  Coping with fatigue  Take catnaps when you can.  Get regular exercise.  Accept help from others.  Practice good sleep habits, like going to bed and getting up at the same time each day. Use your bed only for sleep and sex.  Calming mood swings  Talk about your feelings with others, including other mothers.  Limit sugar, chocolate, and caffeine.  Eat a healthy diet. Don t skip meals.  Get regular exercise.  Soothing headaches  Get fresh  air and exercise.  Relax and get enough rest.  Check with your healthcare provider before taking any pain medicines.    Second trimester tips  To limit ankle swelling, sit with your feet raised or wear support hose.  If you have pain in your groin and stomach (round ligament pain), don't make sudden twisting movements with your body.  For leg cramps, flexing your foot often brings immediate relief. Also try massaging your calf in long, downward strokes, or stretching your legs before going to bed. Get enough exercise and wear shoes with flexible soles. Eat foods rich in calcium.    Third trimester tips  Reducing heartburn  Eat small, light meals throughout the day rather than 3 large ones.  Sleep with your upper body raised 6 inches. Don t lie down until 2 hours after you eat.  Don't eat greasy, fried, or spicy foods.  Don't have citrus fruits or juices.  Treating constipation  Eat foods high in fiber, such as whole-grain foods, and fresh fruit and vegetables).  Drink plenty of water.  Get regular exercise.  Ask about your healthcare provider about medicines that have docusate or psyllium.  Taking care of your breasts  Don't use harsh soaps or alcohol, which can make your skin too dry.  Wear nursing bras. They provide more support than regular bras and can be used after pregnancy if you breastfeed.  Getting a good night s sleep  Take a warm shower before bed.  Sleep on a firm mattress.  Lie on your side with 1 leg crossed over the other.  Use pillows to support your arms, legs, and belly.    [employer] last reviewed this educational content on 8/1/2020 2000-2023 [employer]. All rights reserved. This information is not intended as a substitute for professional medical care. Always follow your healthcare professional's instructions.          Folic Acid Supplements  Folic acid is also called folate. It is one of the B vitamins. Nutrition experts are just starting to learn more about how folic acid helps  the body. It is needed to prevent a shortage of red blood cells (a type of anemia). Experts have also found that folic acid can prevent some birth defects.     How much folic acid do you need?  Adults should have 400 mcg (micrograms) of folic acid each day. Adults may need more if they are planning to get pregnant, are pregnant, or are breastfeeding. Talk with your healthcare provider to find the right amount of folic acid for you.   Why use a supplement?  Taking folic acid both before and during pregnancy is important. This can prevent birth defects of the spine and brain (neural tube defects). A supplement may also be helpful if you drink alcohol often. You may want to use a folic acid supplement if any of the following is true for you:   ? I am a person of childbearing age.   ? I am planning to get pregnant.  ? I rarely eat leafy green vegetables, dried beans, or lentils.   ? I rarely eat cereal and whole grains (wheat germ, brown rice, whole-wheat bread).  ? I often have more than 1 drink of alcohol a day.   If you take folic acid  Here are some tips to help you get the most from a folic acid supplement:  Read the label to be sure the product won't  soon.  Choose a supplement that provides 400 to 800 mcg of folic acid.  Store the supplement in a cool, dry place, away from sun and heat.  Eat a healthy diet to get all the nutrients your body needs.  Foods that have folic acid  Folic acid is found mainly in plants. Some good sources include:  Dark green leafy vegetables such as spinach, kale, collards, and jadyn lettuce  Lentils, chickpeas, and dried beans such as booker, kidney, and black beans  Asparagus, bok tao, broad-beans, broccoli, and [place] sprouts  Avocados, oranges, and orange juice  Wheat germ and whole-wheat products  Products fortified with folic acid, such as cereal, pasta, bread, and rice  Eduardo last reviewed this educational content on 2022-2023 [employer].  All rights reserved. This information is not intended as a substitute for professional medical care. Always follow your healthcare professional's instructions.          Anemia During Pregnancy  Anemia is a condition in which the red blood cell count is too low. In pregnant women, this is often caused by not having enough iron in the blood. Anemia is common in pregnancy and very easy to treat.   Why you need iron   While pregnant, your body uses iron to make red blood cells for you and your baby. These cells bring oxygen to your baby and to the rest of your body. Not having enough red blood cells can cause your baby to be born too small. But this is rare, as it s easy for you to get enough iron.   Testing for anemia   The only way to know if you have anemia is to have a simple test called a CBC (complete blood count). This is a routine test that will be done at one of your first prenatal visits. This test may be done again, at about week 26 to week 28.   Treating anemia   If you have anemia due to low iron content, follow the advice of your healthcare provider. Eating foods high in iron and taking supplements can help you get the iron you need.   Eating foods high in iron      Green leafy vegetables and nuts are a good source of iron.     Eat foods that are high in iron such as:   Red meat (limit organ meats such as liver)  Seafood (be sure it s fully cooked), and don't eat fish that are high in mercury, such as swordfish, tilefish, carri mackerel, and shark  Tofu  Eggs  Green, leafy vegetables  Whole grains and iron-fortified cereals  Dried fruits and nuts  Taking iron supplements   In most cases, a prenatal vitamin can provide enough iron. But if you need more, your healthcare provider may prescribe an iron supplement. Swallow iron pills with a glass of orange or cranberry juice. The vitamin C in these fruit juices can help your body absorb iron. But don t take your iron pills with juices that have calcium added to  them. They can keep your body from absorbing the iron.   Iron supplements   Iron supplements may have certain side effects. They may cause your stools to turn black, and make you feel sick to your stomach or constipated. Here are some tips that may help you limit side effects:   Start slowly. Take 1 pill a day for a few days. Then work up to your prescribed dose over time.  Take your pills with meals, and not at bedtime.  Increase the fiber in your diet. Eat more whole grains, fruits, and vegetables.  Do mild exercise each day.  If advised by your healthcare provider, take a stool softener.  [employer] last reviewed this educational content on 2/1/2020 2000-2023 [employer]. All rights reserved. This information is not intended as a substitute for professional medical care. Always follow your healthcare professional's instructions.          Rh-Negative Screening  If you have Rh-negative blood, your baby may be at risk for health problems. This is true only if your baby has Rh-positive blood. A simple test followed by treatment can help prevent problems.   What are the risks?  If the blood of your baby is Rh positive, your Rh-negative blood may form antibodies. These antibodies will attack the Rh-positive blood. This is called Rh disease. Rh disease can cause your baby to lose blood cells or have other health problems. Medical treatment can prevent Rh disease by keeping antibodies from forming.   How are you tested?  A simple blood test shows if you re Rh negative. This test is done very early in your pregnancy. If you re Rh negative, you ll have a second blood test near week 28 of pregnancy. This test will check whether or not your blood contains Rh antibodies.     [employer] last reviewed this educational content on 12/1/2022 2000-2023 [employer]. All rights reserved. This information is not intended as a substitute for professional medical care. Always follow your healthcare  professional's instructions.          What Is Prenatal Care?  Before getting pregnant, you may have added some good health habits to get ready for your baby. But if you didn t, start today. One of the first steps is learning how to take care of yourself. See your healthcare provider as soon as you think you may be pregnant. Then continue prenatal care during your pregnancy.     Prenatal care helps you have a healthy baby   During prenatal care:  Your healthcare provider checks the health of your pregnancy. They'll calculate a due date. This gives an estimate of your baby's delivery. Many people give birth between 38 and 41 weeks of pregnancy. Your due date is found by counting 40 weeks from the first day of your last menstrual period.  Your pregnancy's progress is checked. This includes your baby s growth, fetal heart rate, changes in your weight and blood pressure, and your overall health and comfort.  Your provider may find new concerns and manage current ones before problems happen.  Your provider will check lab work through blood and urine.  Your provider will talk about normal changes that happen during pregnancy. They'll also talk about changes that may not be normal. And they'll advise you about lifestyle changes.  Your provider will answer your questions. They'll also help you get ready for labor and delivery.  You're part of a team  When you re pregnant, you re part of a team. This team includes you, your baby, and your provider. It also may include a partner or a main support person. That could be a loved one, such as a spouse, a family member, or a friend. As you work to give your baby a healthy start, rely on your team members for support.   It s not too late to start good habits   What matters most is protecting your baby from this moment on. If you smoke, drink alcohol, or use illegal drugs, now's the time to stop. If you need help, talk with your healthcare provider.   Smoking increases the risk of  losing your baby. Or of having a low-birth-weight baby. If you smoke, quit now.  Alcohol and drugs have been linked with many problems. These include miscarriage, birth defects, intellectual disability, and low birth weight. Stay away from alcohol and drugs.  Eat a healthy diet. This helps keep you and your baby strong and healthy. Follow your provider's instructions for nutrition. Also stay within the guidelines you're given for healthy weight gain.  Take 400 micrograms to 800 micrograms (400 mcg to 800 mcg or 0.4 mg to 0.8 mg) of folic acid every day. Take it for at least 1 month before getting pregnant. And keep taking it for the first trimester of your pregnancy. This is to lower your risk of some brain and spinal birth defects. You can get folic acid from some foods. But it's hard to get all the folic acid you'll need from foods alone. Talk with your provider about taking a folic acid supplement.  Regular exercise will help you stay fit and feel good during pregnancy. It can also help prevent or reduce back pain. Talk with your provider about how to exercise safely during pregnancy.  If you have a health condition, make sure it's under control. Some conditions include asthma, diabetes, depression, high blood pressure, obesity, thyroid disease, or epilepsy. Be sure your vaccines are up-to-date.  How daily issues affect your health  Many things in your daily life impact your health. This can include transportation, money problems, housing, access to food, and childcare. If you can t get to medical appointments, you may not receive the care you need. When money is tight, it may be difficult to pay for medicines. And living far from a grocery store can make it hard to buy healthy food.   If you have concerns in any of these or other areas, talk with your healthcare team. They may know of local resources to assist you. Or they may have a staff person who can help.   Eduardo last reviewed this educational content on  3/1/2023    3814-7240 [employer]. All rights reserved. This information is not intended as a substitute for professional medical care. Always follow your healthcare professional's instructions.          Understanding Intimate Partner Violence  Intimate partner violence (IPV) affects hundreds of thousands of women. But the true number may be much higher because many women may not report it. Partner violence often starts or gets worse during and after pregnancy. This may be from the stress of pregnancy and a new baby. IPV can result in pregnancy complications, poor postpartum care, and poor health of the baby.   You may feel alone if you are experiencing intimate partner violence during or after your pregnancy but know that you re not. It s far more common than you think. And there s help and hope. Talk with your healthcare provider if you are not safe.   Types of intimate partner violence  Most people think of IPV as just physical abuse. While it does often include physical abuse, IPV can be emotional and sexual abuse. These other forms of abuse are sometimes harder to see. This is especially true for emotional abuse, because it can distort your own viewpoint.   Physical abuse includes using physical force to hurt or disable a partner. It can include throwing objects, pushing, kicking, biting, slapping, strangling, hitting, or beating.  Emotional abuse includes making threats and controlling money or other resources. It s anything that erodes a person s sense of self-worth. It may look like name-calling, blaming, and stalking. It can also include isolation from friends, family, and even access to healthcare.  Sexual violence includes rape, unwanted kissing, and forced touching. It also includes reproductive coercion. This is holding power and control over the woman s reproductive health. It may look like efforts to sabotage contraception, having unsafe sex to purposefully expose the woman to sexually  "transmitted infections. It may also include forced  or injuries to cause a miscarriage.  Are you at risk for IPV?  IPV affects all genders, races, ethnicities, and social and economic statuses. But some people are at greater risk for being a victim or an abuser. Certain factors can increase the risk for IPV.   Individual factors  Having low self-esteem, being emotional dependent, insecure, and jealous  Having low income or being unemployed, having recent job loss or job instability  Being younger  Using alcohol or drugs  Being depressed, angry, having PTSD, or being hostile towards women  Having a history of abusing others, being abused, or witnessing abuse  Having poor problem-solving skills  Having poor impulse control  Being a Native , , or -American woman  Relationship factors  Marital or relationship conflict, frequent fighting  Having a jealous or possessive partner  Having control issues  Being under economic stress  Having poor social support  Having income inequality    It often helps to ask yourself these questions from the  to know if you are in an abusive relationship:   Does my partner always put me down and make me feel bad about myself?  Has my partner caused harm or pain to my body?  Does my partner threaten me, the baby, my other children, or himself?  Does my partner blame me for his actions? Does he tell me it's my own fault he hit me?  Is my partner becoming more violent as time goes on?  Has my partner promised never to hurt me again, but still does?  If you answered \"yes\" to any of these questions, you may be in an unhealthy relationship.   If you recognize yourself or your partner in any of these descriptors, talk with your healthcare provider to get help. If you are in danger, they can help you develop a safety plan.   Health effects  IPV during or after pregnancy can cause physical and emotional health effects, pregnancy complications, poor " outcomes, and injury and harm to the baby after birth.   During pregnancy  Physical injuries, such as bruises, cuts, or broken bones  Vaginal bleeding or pelvic pain  Early labor and delivery  Tearing of the placenta (placental abruption)  Maternal death  Infant health  Low infant birth weight  Infant who needs care from a  intensive care unit  Broken bones  Death of   After birth  After birth, the mother may:  Have pain and other long-term health conditions  Develop postpartum depression (2 to 3 times greater risk)  Have high-risk sexual behaviors  Use harmful substances  Have unhealthy diet and lifestyle, such as eating disorders  Abuse is never OK. One in 6 abused women are first abused during pregnancy, when it s dangerous to both you and your developing baby. Recognizing that you are in an abusive relationship is the first step to help. See your healthcare provider or someone else you trust who can help you develop a safety plan.   What to expect from your healthcare provider  It can be hard to bring up partner violence with your healthcare provider. But it s an important first step in getting help. Rest assured, your conversation is confidential. They are a nonjudgmental health professional. They likely have other patients with similar problems and recognize the difficulty of the situation. Your provider may ask you questions, such as:   Do you feel safe in your relationship?  Do you have a safe place to go in an emergency?  Do conflicts ever turn into physical fights?  Answer honestly and completely. There will be no pressure to disclose the abuse or to press charges. They won t ask about the abuse in front of a partner, friends, or family. The goal is to help you access help when you are ready.   Call 911  If you feel your safety is in jeopardy now, call 911 or the police.   For more help  If the person who hurt you is your partner or spouse and your situation can become dangerous again, it's  "vital to make a safety plan. There are resources and organizations that can help you.   National Domestic Violence Hotline , www.[employer], 690.828.2438, or text \"START\" to 16514. The National Domestic Violence Hotline can help you develop a plan that meets your personal situation. It is free, confidential, and available 24/7.  National Coalition Against Domestic Violence , www.ncadv.org  National Institutes of Health, hr.nih.gov/working-nih/civil/domestic-violence-resources  [employer] last reviewed this educational content on 12/1/2022 2000-2023 [employer]. All rights reserved. This information is not intended as a substitute for professional medical care. Always follow your healthcare professional's instructions.          Bleeding During Early Pregnancy   If you ve had bleeding early in your pregnancy, you re not alone. Many other pregnant women have early bleeding, too. And in most cases, nothing is wrong. But your healthcare provider still needs to know about it. They may want to do tests to find out why you re bleeding. Call your provider if you see bleeding during pregnancy. Tell your provider if your blood is Rh negative. Then they can figure out if you need anti-D immune globulin treatment.   What causes early bleeding?   The cause of bleeding early in pregnancy is often unknown. But many factors early on in pregnancy may lead to light bleeding (called spotting) or heavier bleeding. These include:   Having sex  When the embryo implants on the uterine wall  Bleeding between the sac membrane and the uterus (subchorionic bleeding)  Pregnancy loss (miscarriage)  The embryo implants outside of the uterus (ectopic pregnancy)  If you see spotting   Light bleeding is the most common type of bleeding in early pregnancy. If you see it, call your healthcare provider. Chances are, they will tell you that you can care for yourself at home.   If tests are needed   Depending on how much you bleed, " your healthcare provider may ask you to come in for some tests. A pelvic exam, for instance, can help see how far along your pregnancy is. You also may have an ultrasound or a Doppler test. These imaging tests use sound waves to check the health of your baby. The ultrasound may be done on your belly or inside your vagina. You may also need a special blood test. This test compares your hormone levels in blood samples taken 2 days apart. The results can help your provider learn more about the implantation of the embryo. Your blood type will also need to be checked to assess if you will need to be treated for Rh sensitization.       Ultrasound can help check the health of your fetus.     Warning signs   If your bleeding doesn t stop or if you have any of the following, get medical care right away:   Soaking a sanitary pad each hour  Bleeding like you re having a period  Cramping or severe belly pain  Feeling dizzy or faint  Tissue passing through your vagina  Bleeding at any time after the first trimester  Questions you may be asked   Bleeding early in pregnancy isn't normal. But it is common. If you ve seen any bleeding, you may be concerned. But keep in mind that bleeding alone doesn t mean something is wrong. Just be sure to call your healthcare provider right away. They may ask you questions like these to help find the cause of your bleeding:   When did your bleeding start?  Is your bleeding very light or is it like a period?  Is the blood bright red or brownish?  Have you had sex recently?  Have you had pain or cramping?  Have you felt dizzy or faint?  Monitoring your pregnancy   Bleeding will often stop as quickly as it began. Your pregnancy may go on a normal path again. You may need to make a few extra prenatal visits. But you and your baby will most likely be fine.   [employer] last reviewed this educational content on 1/1/2022 2000-2023 [employer]. All rights reserved. This information is not  intended as a substitute for professional medical care. Always follow your healthcare professional's instructions.          Healthy Eating Habits During Pregnancy  It s important to develop healthy eating habits while you are pregnant, for you as well as for your baby. Here are some ways to stay healthy.   Aim for a healthy weight  A slow, steady rate of weight gain is often best. After the first trimester, you may gain about a pound a week. If you were overweight before pregnancy, you need to gain fewer pounds. Your healthcare provider can give you a healthy weight goal for your pregnancy.   Don t diet  Now is not the time to diet. You may not get enough of the nutrients you and your baby need. Instead, learn how to be a healthy eater. Start by doing it for your baby. Soon, you may do it for yourself.   Vitamins and supplements  Talk with your healthcare provider about taking these and other prenatal vitamins and supplements.   Iron makes the extra blood you need now.  Calcium and vitamin D help build and keep strong bones.  Folic acid helps prevent certain birth defects.  Iodine helps the thyroid work right.  Some vitamins may not be safe to take. Your healthcare provider will tell you which ones to avoid.  Fluids    Drink at least 8 to 10 cups of fluid daily. Your baby needs fluids. Fluids also decrease constipation, flush out toxins and waste, limit swelling, and help prevent bladder infections. Water is best. Other good choices are:   Water or seltzer water with a slice of lemon or lime. (These can also help ease an upset stomach.)  Clear soups that are low in salt  Low-fat or fat-free milk, soy or rice milk with calcium added  Popsicles or gelatin  Things to avoid  Some things might harm your growing baby. Don t eat or drink:   Alcohol  Unpasteurized dairy foods and juices  Raw or undercooked meat, poultry, fish, or eggs  Unwashed fruits and vegetables  Prepared meats, like deli meats or hot dogs, unless  heated until steaming hot  Fish that are high in mercury, like shark, swordfish, carri mackerel, tilefish, and albacore tuna  Things to limit  Ask your healthcare provider whether it s safe to eat or drink:   Caffeine  Artificial sweeteners  Organ meats  Certain types of fish  Fish and shellfish that contain mercury in lower amounts, like shrimp, canned light tuna, salmon, pollock, and catfish  [employer] last reviewed this educational content on 7/1/2021 2000-2023 [employer]. All rights reserved. This information is not intended as a substitute for professional medical care. Always follow your healthcare professional's instructions.          Pregnancy: Planning Your Exercise Routine  While you re pregnant, an exercise routine helps both your mind and your body feel good. It tones your muscles and makes them stronger. It also gives you and your baby more oxygen.   The right exercise for you    Overall conditioning is best for you and your baby. Try walking, swimming, or riding a stationary bike. Always warm up, cool down, and drink enough fluids. Keep a snack close by in case your blood sugar gets low. Discuss exercise choices with your healthcare provider. Talk about the following:   If you already exercise, find out how to adapt your routine while you re pregnant. Keep the intensity of the exercise moderate. As your pregnancy progresses, your center of gravity will change. Be careful to keep your balance.  Ask if there are any local prenatal exercise classes, such as yoga or water aerobics. Find out which prenatal exercise videos are good choices.  If you were not exercising before your pregnancy, find out the best way to start. Now is not the time to begin a new workout on your own. Start slowly. Listen to your body.  Ask which forms of exercise you should avoid. These may include risky activities like hot yoga, horseback riding, scuba diving, skiing, skating, and contact sports.  Pelvic  tilts  These help strengthen your stomach muscles and low back. You can do pelvic tilts instead of sit-ups.   Do this exercise on your hands and knees.  Relax the back of your neck. Pull your stomach in until your low back flattens.  Hold for 30 seconds. Release. Repeat 10 times. Do this twice a day.  Kegel exercises  Kegel exercises strengthen the pelvic muscles. Doing Kegels daily helps prepare these muscles for delivery. Kegels also help ease your recovery. You exercise these muscles by tightening, holding, then relaxing them. To do 1 type of Kegel exercise, contract as if you were stopping your urine stream (but do it when you re not urinating). Hold for 10 seconds, then repeat 10 times, a few times a day.   Tips to add activity  Here are some tips to follow:  Park the car farther from a store and walk.  If you can, do errands on foot instead of driving.  Walk across the office to talk to someone in person instead of calling.  While waiting for appointments, go up and down stairs or around the block.  Tips to stay active  Here are some tips to follow:  Maintain your routine. But exercise less intensely if you feel tired.  Base your workout on how you feel, not your heart rate. Heart rates aren t a good way to measure effort during pregnancy.  Don't exercise on your back after week 16.  What are the warning signs that I should stop exercising?  Stop exercising and call your healthcare provider if you have any of these symptoms:  Vaginal bleeding   Dizziness or feeling faint   Increased shortness of breath   Chest pain   Headache   Muscle weakness   Calf (back of the leg) pain or swelling    Uterine contractions or  labor   Decreased fetal movement   Fluid leaking (or gushing) from your vagina  [employer] last reviewed this educational content on 2021-2023 [employer]. All rights reserved. This information is not intended as a substitute for professional medical care. Always follow  your healthcare professional's instructions.          Nutrition During Pregnancy   Having a healthy baby depends mostly on you. What you eat matters to your baby and your health. During pregnancy, you will likely need about 300 more calories per day than before you became pregnant. Each day, try to eat the number of servings listed here for each food group. In addition, cut down on salt and caffeine. Limit the amount of sweets and high-fat foods you eat. Don t smoke or drink alcohol.     Important: See your healthcare provider as often as asked. If you have any questions, be sure to ask them.   Fruits Vegetables Grains & Cereals* Fats & Oils   2 cups  Examples of 1-cup servings:   1 medium apple  1 medium orange  1 medium banana  1 cup chopped fruit  1 cup 100% fruit juice (pasteurized)   1/2 cup dried fruit 2-1/2 to 3 cups   Examples of 1 servin cups raw, leafy greens   1 cup raw or cooked cut-up vegetables   1 cup 100% vegetable juice (pasteurized)  6 to 8 ounces  Examples of 1-ounce servings:   1 slice bread  1/2 cup cooked rice  1/2 cup cooked cereal   1/2 cup pasta  1 ounce cold cereal 6 to 8 teaspoons   Dairy** Protein--- Fluids      3 cups  Examples of 1-cup servings:   1 cup milk  1 cup yogurt  1-1/2 ounces natural cheese   2 ounces processed cheese  5 to 6-1/2 ounces  Examples of 1-ounce servings:   1 egg  1 ounce of lean meat, poultry, or fish   1/4 cup cooked beans   1 tablespoon peanut butter   1/2 ounce nuts 8 or more 8-ounce glasses   Examples:  Water  Mineral water  Clear soups, broth      *Note: Choose whole grains whenever possible.   ** Note: Try to choose low-fat foods; stay away from soft cheeses and unpasteurized milk.   --- Notes: Don't eat raw or undercooked meats, eggs, seafood, fish, or shellfish. Some types of fish, such as shark, swordfish, and carri mackerel, should not be eaten during pregnancy. Don't eat hot dogs, lunch meats, or cold cuts unless heated to steaming just before  being served. Ask your healthcare provider about safe choices.   Prenatal supplements  A prenatal supplement is a pill that you take daily during pregnancy. It helps make sure you re getting the right amount of certain nutrients that are important to your baby. Ask your healthcare provider to help you choose the best one for you. Important nutrients during pregnancy include:   Folic acid. It's best to start taking this supplement 1 month before you start trying to get pregnant. Folic acid helps prevent certain problems in your baby. During pregnancy, you need to take 400 micrograms (mcg) of folic acid every day for the first 2 to 3 months after conception. After that, 600 mcg is needed for a growing baby and placenta.  Iron, calcium, and vitamin D. You may also be advised to take these supplements during pregnancy. They help keep you and your baby healthy. Take them at different times because calcium makes it hard for the body to absorb iron. Taking iron with orange juice helps to increase its absorption.  [employer] last reviewed this educational content on 12/1/2022 2000-2023 [employer]. All rights reserved. This information is not intended as a substitute for professional medical care. Always follow your healthcare professional's instructions.          Pregnancy: Your Weight  Being a healthy weight is important for both you and your baby. The weight you gain now is not just extra fat. It is also the weight of your baby. And it is the increased blood and fluids to support the baby. A slow, steady rate of gain is best. How much you should gain depends on your weight before getting pregnant. Check with your healthcare provider to find out what is right for you.      Your weight will be checked regularly by your healthcare provider.     Talk to your healthcare provider if you have any questions.   If you gain too much  Gaining too much weight might cause you to feel tired, or you could have a harder  pregnancy or birth. If you and your healthcare provider decide you re gaining too much:   Eat fewer fats and sugars. Instead, eat fruit, vegetables, and whole-grain foods.  Drink plenty of water between meals.  Get at least 20 minutes of light exercise, like walking, each day.  Don t diet. You might not get enough of the nutrients you and your baby need.  Keep a food diary to help you gauge what and how much you are eating.  If you're not gaining enough  If you don t gain enough, your baby could be too small or have health problems. Women tend to gain most of their weight in the second and third trimesters. For now:   Eat many types of foods. Make sure you get enough calcium, protein, and carbohydrates.  Don t skip meals.  Eat healthy snacks.  Pick nutrient-dense, high-calorie healthy food like trail mix or protein shakes.  See a dietitian for help.  Talk to your healthcare provider if you have had an eating disorder or problems with certain foods.  The following are ways to get more calories:  Eat breakfast every day. Peanut butter or a slice of cheese on toast can give you an extra protein boost.  Snack between meals. Yogurt and dried fruits can provide protein, calcium, and minerals.  Try to eat more foods that are high in good fats, like nuts, fatty fish, avocados, and olive oil.  Drink juices made from real fruit that are high in vitamin C or beta-carotene, like grapefruit juice, orange juice, papaya nectar, apricot nectar, and carrot juice.  Don't eat junk food, like foods high in sugar.  [employer] last reviewed this educational content on 7/1/2021 2000-2023 [employer]. All rights reserved. This information is not intended as a substitute for professional medical care. Always follow your healthcare professional's instructions.        You have been provided the CDC Warning Signs in Pregnancy document.    Additional copies can be found here: [email or web address]

## 2023-07-10 NOTE — PATIENT INSTRUCTIONS
"  Pregnancy: Your First Trimester Changes  The first trimester is a time of rapid development for your baby. Because your baby is growing so quickly, it is important that you start a healthy lifestyle right away. By the end of the first trimester, your baby has formed all of its major body organs and weighs just over an ounce.   Month 1 (weeks 1 to 4)  The placenta (the organ that nourishes your baby) begins to form. The brain, spinal cord, heart, gastrointestinal tract, and lungs begin to develop. Your baby is about   inch long by the end of the first month.      Actual size of baby is 1/4\".     Month 2 (weeks 5 to 8)  All of your baby s major body organs form. The face, fingers, toes, ears, and eyes appear. By the end of the month, your baby is about 1 inch long.      Actual size of baby is 1\".     Month 3 (weeks 9 to 12)  Your baby can open and close its fists and mouth. The sexual organs begin to form. As the first trimester ends, your baby is about 3 inches long.      Actual size of baby is 3\".     [employer] last reviewed this educational content on 12/1/2022 2000-2023 [employer]. All rights reserved. This information is not intended as a substitute for professional medical care. Always follow your healthcare professional's instructions.          Vitamin B6 (pyridoxine) Oral Tablet  Uses  This medicine is used for the following purposes:  metabolism disorder  nausea and vomiting  vitamin deficiency  Instructions  This medicine may be taken with or without food.  Store at room temperature away from heat, light, and moisture. Do not keep in the bathroom.  If you forget to take a dose on time, take it as soon as you remember. If it is almost time for the next dose, do not take the missed dose. Return to your normal dosing schedule. Do not take 2 doses of this medicine at one time.  Drug interactions can change how medicines work or increase risk for side effects. Tell your health care providers " about all medicines taken. Include prescription and over-the-counter medicines, vitamins, and herbal medicines. Speak with your doctor or pharmacist before starting or stopping any medicine.  Speak with your doctor or pharmacist before starting any other vitamins.  It is very important that you follow your doctor's instructions for all blood tests.  Cautions  Tell your doctor and pharmacist if you ever had an allergic reaction to a medicine.  Do not use the medication any more than instructed.  Tell the doctor or pharmacist if you are pregnant, planning to be pregnant, or breastfeeding.  Side Effects  If you have any of the following side effects, you may be getting too much medicine. Please contact your doctor to let them know about these side effects.  drowsiness or sedation  numbness or tingling in hands and feet  headaches  nausea  stomach upset or abdominal pain  This medicine usually has no side effects.  A few people may have an allergic reaction to this medicine. Symptoms can include difficulty breathing, skin rash, itching, swelling, or severe dizziness. If you notice any of these symptoms, seek medical help quickly.  Extra  Please speak with your doctor, nurse, or pharmacist if you have any questions about this medicine.  [email or web address]  IMPORTANT NOTE: This document tells you briefly how to take your medicine, but it does not tell you all there is to know about it. Your doctor or pharmacist may give you other documents about your medicine. Please talk to them if you have any questions. Always follow their advice. There is a more complete description of this medicine available in English. Scan this code on your smartphone or tablet or use the web address below. You can also ask your pharmacist for a printout. If you have any questions, please ask your pharmacist. The display and use of this drug information is subject to Terms of Use. Copyright(c) 2022 [employer]      2777-0654 [employer]. All rights reserved. This information is not intended as a substitute for professional medical care. Always follow your healthcare professional's instructions.          Adapting to Pregnancy: First Trimester  As your body adjusts during your first trimester of pregnancy, you may have to change or limit your daily activities. You ll need more rest. You may also need to use the energy you have more wisely.  Your changing body  Almost every part of your body is affected as you adapt to pregnancy. The uterus and cervix will start to soften right away. You may not look very pregnant during the first 3 months. But you are likely to have some common signs of early pregnancy:  Nausea  Fatigue  Frequent urination  Mood swings  Bloating of the belly  Constipation  Heartburn  Missed or light periods (first trimester bleeding)  Nipple or breast tenderness and breast swelling  It s not too late to start good habits  What matters most is protecting your baby from this moment on. If you smoke, drink alcohol, or use drugs, now is the time to stop. If you need help, talk with your healthcare provider:  Smoking increases the risk of stillbirth or having a low-birth-weight baby. If you smoke, quit now.  Alcohol and drugs have been linked with miscarriage, birth defects, intellectual disability, and low birth weight. Don't drink alcohol or take drugs.  Tips to relieve nausea  During pregnancy, nausea can happen at any time of the day, but it may be worse in the morning. To help prevent nausea:  Eat small, light meals at frequent intervals.  Drink fluids often.  Get up slowly. Eat a few unsalted crackers before you get out of bed.  Avoid smells that bother you.  Avoid spicy and fatty foods.  Eat an ice pop in your favorite flavor.  Get plenty of rest.  Ask your healthcare provider about taking tra or vitamin B6 for nausea and vomiting.  Talk with your healthcare provider if you take vitamins that  upset your stomach.  Work concerns  The end of the first trimester is a good time to discuss working during pregnancy with your employer. Follow your healthcare provider s advice if your job needs you to stand for a long time, work with hazardous tools, or even sit at a desk all day. Your workspace, workload, or scheduled hours may need to be adjusted. Perhaps you can change body postures more often or take an extra break.  Advice for travel  Talk to your healthcare provider first, but the second trimester may be the best time for any travel. You may be advised to avoid certain trips while you re pregnant. Food and water can be concerns in developing countries. Travel by car is a good choice, as you can stop, get out, and stretch. Bring snacks and water along. Fasten the lap belt below your belly, low over your hips. Also be sure to wear the shoulder harness.  Intimacy  Unless your healthcare provider tells you to, there's no reason to stop having sex while you re pregnant. You or your partner may notice changes in desire. Desire may be less in the first trimester due to nausea and fatigue. In the second trimester, sex may be very enjoyable. The third trimester can be a challenge comfort-wise. Try different positions and see what s best for you both.     How daily issues affect your health  Many things in your daily life impact your health. This can include transportation, money problems, housing, access to food, and . If you can t get to medical appointments, you may not receive the care you need. When money is tight, it may be difficult to pay for medicines. And living far from a grocery store can make it hard to buy healthy food.  If you have concerns in any of these or other areas, talk with your healthcare team. They may know of local resources to assist you. Or they may have a staff person who can help.  [employer] last reviewed this educational content on 11/1/2022 2000-2023 [employer]. All rights reserved. This information is not intended as a substitute for professional medical care. Always follow your healthcare professional's instructions.          Pregnancy: Common Questions  There are plenty of myths and  old wives  tales  about pregnancy. You may need help  fact from fiction. On this sheet, you ll find answers to a few common questions. If you have other questions, talk with your healthcare provider.     Will working harm my baby?  In most cases, working throughout your pregnancy is not harmful at all. There may be concerns if the job involves dangerous machinery or chemicals, lifting, or standing for very long periods of time. Talk with your healthcare provider and employer about your particular job and pregnancy.   Is it safe to have sex during pregnancy?   Yes, unless you are specifically advised not to by your healthcare provider.   Why can t I change the cat litter box?   Cats carry a disease called toxoplasmosis. In adult humans, it shows up as a mild infection of the blood and organs. If you are infected during pregnancy, the baby s brain and eyes could be damaged. To be safe, have someone else change the litter. If you must handle it, wear a paper mask over your nose and mouth. Also, wear gloves and wash your hands afterward.   Which medicines are safe?  No prescription or over-the-counter medicine is safe for everyone all of the time. But sometimes medicines are needed. Be sure your healthcare provider knows you are pregnant. Then use only the medicines they advise you to take.   Is it true that I can overheat my baby?   Yes. To prevent making your baby too warm:   Don t sit in a Jacuzzi. A long, warm bath is fine, but not in water over 100 F (37.7 C).  Exercise less intensely if you feel tired. Base your workout on how you feel, not on your heart rate. Heart rates aren t a good way to measure effort during pregnancy.  Can I lift and carry safely?  Yes, if your  healthcare provider doesn t tell you otherwise. Learn to lift and carry safely to prevent injury and reduce back pain during pregnancy. To protect your back:   Bend at the knees to bring the load nearer.  Get a good . Test the weight of the load.  Tighten your belly. Exhale as you lift.  Lift with your legs, not with your back.  Carry the load close to your body.  Hold the load so you can see where you are going.  What if I get sick?  Most people get sick at least once during pregnancy. Talk with your healthcare provider if you do. Most likely it will not affect your pregnancy. Get plenty of rest and fluids and eat what you can. Talk with your provider before taking any medicines.   [employer] last reviewed this educational content on 12/1/2022 2000-2023 [employer]. All rights reserved. This information is not intended as a substitute for professional medical care. Always follow your healthcare professional's instructions.          Comfort Tips During Pregnancy  Pregnancy can bring discomfort of different kinds. Below are tips for ways to feel better. Talk with your healthcare provider before using pain-relieving medicine at any time during your pregnancy.    First trimester tips  Easing nausea  Get up slowly. Eat a few unsalted crackers before you get out of bed.  Avoid smells that bother you.  Eat small, bland, low-fat, high-protein meals at frequent intervals.  Sip on water, weak tea, or clear soft drinks, like ginger ale. Eat ice chips.  Try taking vitamin B6.  Coping with fatigue  Take catnaps when you can.  Get regular exercise.  Accept help from others.  Practice good sleep habits, like going to bed and getting up at the same time each day. Use your bed only for sleep and sex.  Calming mood swings  Talk about your feelings with others, including other mothers.  Limit sugar, chocolate, and caffeine.  Eat a healthy diet. Don t skip meals.  Get regular exercise.  Soothing headaches  Get fresh  air and exercise.  Relax and get enough rest.  Check with your healthcare provider before taking any pain medicines.    Second trimester tips  To limit ankle swelling, sit with your feet raised or wear support hose.  If you have pain in your groin and stomach (round ligament pain), don't make sudden twisting movements with your body.  For leg cramps, flexing your foot often brings immediate relief. Also try massaging your calf in long, downward strokes, or stretching your legs before going to bed. Get enough exercise and wear shoes with flexible soles. Eat foods rich in calcium.    Third trimester tips  Reducing heartburn  Eat small, light meals throughout the day rather than 3 large ones.  Sleep with your upper body raised 6 inches. Don t lie down until 2 hours after you eat.  Don't eat greasy, fried, or spicy foods.  Don't have citrus fruits or juices.  Treating constipation  Eat foods high in fiber, such as whole-grain foods, and fresh fruit and vegetables).  Drink plenty of water.  Get regular exercise.  Ask about your healthcare provider about medicines that have docusate or psyllium.  Taking care of your breasts  Don't use harsh soaps or alcohol, which can make your skin too dry.  Wear nursing bras. They provide more support than regular bras and can be used after pregnancy if you breastfeed.  Getting a good night s sleep  Take a warm shower before bed.  Sleep on a firm mattress.  Lie on your side with 1 leg crossed over the other.  Use pillows to support your arms, legs, and belly.    [employer] last reviewed this educational content on 8/1/2020 2000-2023 [employer]. All rights reserved. This information is not intended as a substitute for professional medical care. Always follow your healthcare professional's instructions.          Folic Acid Supplements  Folic acid is also called folate. It is one of the B vitamins. Nutrition experts are just starting to learn more about how folic acid helps  the body. It is needed to prevent a shortage of red blood cells (a type of anemia). Experts have also found that folic acid can prevent some birth defects.     How much folic acid do you need?  Adults should have 400 mcg (micrograms) of folic acid each day. Adults may need more if they are planning to get pregnant, are pregnant, or are breastfeeding. Talk with your healthcare provider to find the right amount of folic acid for you.   Why use a supplement?  Taking folic acid both before and during pregnancy is important. This can prevent birth defects of the spine and brain (neural tube defects). A supplement may also be helpful if you drink alcohol often. You may want to use a folic acid supplement if any of the following is true for you:   ? I am a person of childbearing age.   ? I am planning to get pregnant.  ? I rarely eat leafy green vegetables, dried beans, or lentils.   ? I rarely eat cereal and whole grains (wheat germ, brown rice, whole-wheat bread).  ? I often have more than 1 drink of alcohol a day.   If you take folic acid  Here are some tips to help you get the most from a folic acid supplement:  Read the label to be sure the product won't  soon.  Choose a supplement that provides 400 to 800 mcg of folic acid.  Store the supplement in a cool, dry place, away from sun and heat.  Eat a healthy diet to get all the nutrients your body needs.  Foods that have folic acid  Folic acid is found mainly in plants. Some good sources include:  Dark green leafy vegetables such as spinach, kale, collards, and jadyn lettuce  Lentils, chickpeas, and dried beans such as booker, kidney, and black beans  Asparagus, bok tao, broad-beans, broccoli, and [place] sprouts  Avocados, oranges, and orange juice  Wheat germ and whole-wheat products  Products fortified with folic acid, such as cereal, pasta, bread, and rice  [employer] last reviewed this educational content on 2022-2023 [employer].  All rights reserved. This information is not intended as a substitute for professional medical care. Always follow your healthcare professional's instructions.          Anemia During Pregnancy  Anemia is a condition in which the red blood cell count is too low. In pregnant women, this is often caused by not having enough iron in the blood. Anemia is common in pregnancy and very easy to treat.   Why you need iron   While pregnant, your body uses iron to make red blood cells for you and your baby. These cells bring oxygen to your baby and to the rest of your body. Not having enough red blood cells can cause your baby to be born too small. But this is rare, as it s easy for you to get enough iron.   Testing for anemia   The only way to know if you have anemia is to have a simple test called a CBC (complete blood count). This is a routine test that will be done at one of your first prenatal visits. This test may be done again, at about week 26 to week 28.   Treating anemia   If you have anemia due to low iron content, follow the advice of your healthcare provider. Eating foods high in iron and taking supplements can help you get the iron you need.   Eating foods high in iron      Green leafy vegetables and nuts are a good source of iron.     Eat foods that are high in iron such as:   Red meat (limit organ meats such as liver)  Seafood (be sure it s fully cooked), and don't eat fish that are high in mercury, such as swordfish, tilefish, carri mackerel, and shark  Tofu  Eggs  Green, leafy vegetables  Whole grains and iron-fortified cereals  Dried fruits and nuts  Taking iron supplements   In most cases, a prenatal vitamin can provide enough iron. But if you need more, your healthcare provider may prescribe an iron supplement. Swallow iron pills with a glass of orange or cranberry juice. The vitamin C in these fruit juices can help your body absorb iron. But don t take your iron pills with juices that have calcium added to  them. They can keep your body from absorbing the iron.   Iron supplements   Iron supplements may have certain side effects. They may cause your stools to turn black, and make you feel sick to your stomach or constipated. Here are some tips that may help you limit side effects:   Start slowly. Take 1 pill a day for a few days. Then work up to your prescribed dose over time.  Take your pills with meals, and not at bedtime.  Increase the fiber in your diet. Eat more whole grains, fruits, and vegetables.  Do mild exercise each day.  If advised by your healthcare provider, take a stool softener.  [employer] last reviewed this educational content on 2/1/2020 2000-2023 [employer]. All rights reserved. This information is not intended as a substitute for professional medical care. Always follow your healthcare professional's instructions.          Bleeding During Early Pregnancy   If you ve had bleeding early in your pregnancy, you re not alone. Many other pregnant women have early bleeding, too. And in most cases, nothing is wrong. But your healthcare provider still needs to know about it. They may want to do tests to find out why you re bleeding. Call your provider if you see bleeding during pregnancy. Tell your provider if your blood is Rh negative. Then they can figure out if you need anti-D immune globulin treatment.   What causes early bleeding?   The cause of bleeding early in pregnancy is often unknown. But many factors early on in pregnancy may lead to light bleeding (called spotting) or heavier bleeding. These include:   Having sex  When the embryo implants on the uterine wall  Bleeding between the sac membrane and the uterus (subchorionic bleeding)  Pregnancy loss (miscarriage)  The embryo implants outside of the uterus (ectopic pregnancy)  If you see spotting   Light bleeding is the most common type of bleeding in early pregnancy. If you see it, call your healthcare provider. Chances are, they  will tell you that you can care for yourself at home.   If tests are needed   Depending on how much you bleed, your healthcare provider may ask you to come in for some tests. A pelvic exam, for instance, can help see how far along your pregnancy is. You also may have an ultrasound or a Doppler test. These imaging tests use sound waves to check the health of your baby. The ultrasound may be done on your belly or inside your vagina. You may also need a special blood test. This test compares your hormone levels in blood samples taken 2 days apart. The results can help your provider learn more about the implantation of the embryo. Your blood type will also need to be checked to assess if you will need to be treated for Rh sensitization.       Ultrasound can help check the health of your fetus.     Warning signs   If your bleeding doesn t stop or if you have any of the following, get medical care right away:   Soaking a sanitary pad each hour  Bleeding like you re having a period  Cramping or severe belly pain  Feeling dizzy or faint  Tissue passing through your vagina  Bleeding at any time after the first trimester  Questions you may be asked   Bleeding early in pregnancy isn't normal. But it is common. If you ve seen any bleeding, you may be concerned. But keep in mind that bleeding alone doesn t mean something is wrong. Just be sure to call your healthcare provider right away. They may ask you questions like these to help find the cause of your bleeding:   When did your bleeding start?  Is your bleeding very light or is it like a period?  Is the blood bright red or brownish?  Have you had sex recently?  Have you had pain or cramping?  Have you felt dizzy or faint?  Monitoring your pregnancy   Bleeding will often stop as quickly as it began. Your pregnancy may go on a normal path again. You may need to make a few extra prenatal visits. But you and your baby will most likely be fine.   [employer] last reviewed this  educational content on 1/1/2022 2000-2023 [employer]. All rights reserved. This information is not intended as a substitute for professional medical care. Always follow your healthcare professional's instructions.

## 2023-07-11 LAB — BACTERIA UR CULT: NO GROWTH

## 2023-08-15 ENCOUNTER — PRE VISIT (OUTPATIENT)
Dept: MATERNAL FETAL MEDICINE | Facility: HOSPITAL | Age: 38
End: 2023-08-15
Payer: COMMERCIAL

## 2023-08-17 ENCOUNTER — ANCILLARY PROCEDURE (OUTPATIENT)
Dept: ULTRASOUND IMAGING | Facility: HOSPITAL | Age: 38
End: 2023-08-17
Attending: OBSTETRICS & GYNECOLOGY
Payer: COMMERCIAL

## 2023-08-17 ENCOUNTER — OFFICE VISIT (OUTPATIENT)
Dept: MATERNAL FETAL MEDICINE | Facility: HOSPITAL | Age: 38
End: 2023-08-17
Attending: OBSTETRICS & GYNECOLOGY
Payer: COMMERCIAL

## 2023-08-17 DIAGNOSIS — O09.522 MULTIGRAVIDA OF ADVANCED MATERNAL AGE IN SECOND TRIMESTER: Primary | ICD-10-CM

## 2023-08-17 DIAGNOSIS — O26.90 PREGNANCY RELATED CONDITION, ANTEPARTUM: ICD-10-CM

## 2023-08-17 DIAGNOSIS — O09.521 MULTIGRAVIDA OF ADVANCED MATERNAL AGE IN FIRST TRIMESTER: Primary | ICD-10-CM

## 2023-08-17 PROCEDURE — 99207 PR NO CHARGE LOS: CPT | Performed by: OBSTETRICS & GYNECOLOGY

## 2023-08-17 PROCEDURE — 76811 OB US DETAILED SNGL FETUS: CPT | Mod: 26 | Performed by: OBSTETRICS & GYNECOLOGY

## 2023-08-17 PROCEDURE — 999N000069 HC STATISTIC GENETIC COUNSELING, < 16 MIN

## 2023-08-17 PROCEDURE — 76811 OB US DETAILED SNGL FETUS: CPT

## 2023-08-17 NOTE — PROGRESS NOTES
"[facility]  Genetic Counseling Consult    Patient:  Vianey Cortez YOB: 1985   Date of Service:  23   MRN: [id number]    Vianey was seen at the [facility] for genetic consultation. The indication for genetic counseling is advanced maternal age. The patient was accompanied to this visit by their son Randall.     The session was conducted in English.        IMPRESSION/ PLAN   1. Vianey has not had genetic screening in this pregnancy and declines options discussed today.     2. During today's [facility] visit, Vianey had a genetic counseling session only. Screening and diagnostic testing was discussed and declined.      3. Vianey had a level II comprehensive anatomy ultrasound today. Please see the ultrasound report for further details.    PREGNANCY HISTORY   /Parity:       Vianey's pregnancy history is significant for:   3/2022: Term , male    CURRENT PREGNANCY   Current Age: 37 year old   Age at Delivery: 38 year old  SHEY: 2024, by Est. Date of Conception                                   Gestational Age: 18w4d  This pregnancy is a single gestation.   This pregnancy was conceived spontaneously.    MEDICAL HISTORY   Vianey s reported medical history is not expected to impact pregnancy management or risks to fetal development.       FAMILY HISTORY   A three-generation pedigree was obtained previously by a genetic counselor on 2021 and updated today. The original and updated version is scanned under the \"Media\" tab in Epic.  The family history was reported by Vianey.    The following significant updates were reported today:   Vianey's son Randall is 16 months and is healthy and typically developing.      Otherwise, the reported family history is unremarkable for multiple miscarriages, stillbirths, birth defects, intellectual disabilities, known genetic conditions, and consanguinity.     "   CARRIER SCREENING   Expanded carrier screening is available to screen for autosomal recessive conditions and X-linked conditions in a large list of genes. Autosomal recessive conditions happen when a mutation has been inherited from the egg and sperm and include conditions like cystic fibrosis, thalassemia, hearing loss, spinal muscular atrophy, and more. X-linked conditions happen when a mutation has been inherited from the egg and include conditions like fragile X syndrome.  screening was also reviewed. About [place]  Screening    Carrier screening was not discussed today. If the patient is interested in further discussing the option of carrier screening, MFM would be available to assist in coordination if desired.       RISK ASSESSMENT FOR CHROMOSOME CONDITIONS   We explained that the risk for fetal chromosome abnormalities increases with maternal age. We discussed specific features of common chromosome abnormalities, including Down syndrome, trisomy 13, trisomy 18, and sex chromosome trisomies.    At age 38 at midtrimester, the risk to have a baby with Down syndrome is 1 in 216.   At age 38 at midtrimester, the risk to have a baby with any chromosome abnormality is 1 in 105.     Vianey has not had genetic screening in this pregnancy and declines options discussed today.     GENETIC TESTING OPTIONS   Genetic testing during a pregnancy includes screening and diagnostic procedures.      Screening tests are non-invasive which means no risk to the pregnancy and includes ultrasounds and blood work. The benefits and limitations of screening were reviewed. Screening tests provide a risk assessment (chance) specific to the pregnancy for certain fetal chromosome abnormalities but cannot definitively diagnose or exclude a fetal chromosome abnormality. Follow-up genetic counseling and consideration of diagnostic testing is recommended with any abnormal screening result. Diagnostic testing during a pregnancy is  more certain and can test for more conditions. However, the tests do have a risk of miscarriage that requires careful consideration. These tests can detect fetal chromosome abnormalities with greater than 99% certainty. Results can be compromised by maternal cell contamination or mosaicism and are limited by the resolution of current genetic testing technology.     There is no screening or diagnostic test that detects all forms of birth defects or intellectual disability.     We discussed the following screening options:   Vianey declined an in depth discussion of screening options today, but is familiar with NIPT as she did NIPT in her last pregnancy. She is aware these options remain. She was encouraged to contact myself or [facility] if they would like to discuss these options further at a later time.     We discussed the following ultrasound options:  Comprehensive level II ultrasound (Fetal Anatomy Ultrasound)  Ultrasound done between 18-20 weeks gestation  Screens for major birth defects and markers for aneuploidy (like trisomy 21 and trisomy 18)  Includes assessment of the fetal growth, internal organs, placenta, and amniotic fluid    We discussed the following diagnostic options:   Amniocentesis  Invasive diagnostic procedure done after 15 weeks gestation  The procedure collects a small sample of amniotic fluid for the purpose of chromosomal testing and/or other genetic testing  Diagnostic result; more than 99% sensitivity for fetal chromosome abnormalities  Testing for AFP in the amniotic fluid can test for open neural tube defects      It was a pleasure to be involved with Vianey s care. Face-to-face time of the meeting was 15 minutes.    SHILA Ashford, [facility]  Certified and [place] Licensed Genetic Counselor  [facility]  Maternal Fetal Medicine  Office: [phone]  [facility]: [phone]   Fax: [phone]  [facility]

## 2023-08-17 NOTE — NURSING NOTE
Vianey Cortez is a  at 18w4d who presents to [facility] for scheduled genetic counseling and L2.  SBAR given to Dr. Jain, see note in Epic.

## 2024-06-16 ENCOUNTER — HEALTH MAINTENANCE LETTER (OUTPATIENT)
Age: 39
End: 2024-06-16

## (undated) RX ORDER — LIDOCAINE HYDROCHLORIDE 10 MG/ML
INJECTION, SOLUTION EPIDURAL; INFILTRATION; INTRACAUDAL; PERINEURAL
Status: DISPENSED
Start: 2022-03-11

## (undated) RX ORDER — FENTANYL CITRATE 50 UG/ML
INJECTION, SOLUTION INTRAMUSCULAR; INTRAVENOUS
Status: DISPENSED
Start: 2022-03-11